# Patient Record
Sex: MALE | Race: WHITE | NOT HISPANIC OR LATINO | Employment: FULL TIME | URBAN - METROPOLITAN AREA
[De-identification: names, ages, dates, MRNs, and addresses within clinical notes are randomized per-mention and may not be internally consistent; named-entity substitution may affect disease eponyms.]

---

## 2019-01-30 ENCOUNTER — APPOINTMENT (OUTPATIENT)
Dept: RADIOLOGY | Facility: MEDICAL CENTER | Age: 29
DRG: 083 | End: 2019-01-30
Attending: EMERGENCY MEDICINE

## 2019-01-30 ENCOUNTER — HOSPITAL ENCOUNTER (OUTPATIENT)
Dept: RADIOLOGY | Facility: MEDICAL CENTER | Age: 29
End: 2019-01-30

## 2019-01-30 ENCOUNTER — HOSPITAL ENCOUNTER (INPATIENT)
Facility: MEDICAL CENTER | Age: 29
LOS: 9 days | DRG: 083 | End: 2019-02-08
Attending: EMERGENCY MEDICINE | Admitting: SURGERY

## 2019-01-30 DIAGNOSIS — I62.9 ICB (INTRACRANIAL BLEED) (HCC): ICD-10-CM

## 2019-01-30 DIAGNOSIS — S02.0XXA CLOSED FRACTURE OF VAULT OF SKULL, INITIAL ENCOUNTER (HCC): ICD-10-CM

## 2019-01-30 PROBLEM — Z53.09 CONTRAINDICATION TO DEEP VEIN THROMBOSIS (DVT) PROPHYLAXIS: Status: ACTIVE | Noted: 2019-01-30

## 2019-01-30 PROBLEM — T14.90XA TRAUMA: Status: ACTIVE | Noted: 2019-01-30

## 2019-01-30 PROBLEM — S02.91XA SKULL FRACTURE (HCC): Status: ACTIVE | Noted: 2019-01-30

## 2019-01-30 LAB
ABO GROUP BLD: NORMAL
ABO GROUP BLD: NORMAL
ALBUMIN SERPL BCP-MCNC: 4.1 G/DL (ref 3.2–4.9)
ALBUMIN/GLOB SERPL: 1.6 G/DL
ALP SERPL-CCNC: 47 U/L (ref 30–99)
ALT SERPL-CCNC: 39 U/L (ref 2–50)
ANION GAP SERPL CALC-SCNC: 10 MMOL/L (ref 0–11.9)
APTT PPP: 26.1 SEC (ref 24.7–36)
AST SERPL-CCNC: 45 U/L (ref 12–45)
BILIRUB SERPL-MCNC: 0.8 MG/DL (ref 0.1–1.5)
BLD GP AB SCN SERPL QL: NORMAL
BUN SERPL-MCNC: 20 MG/DL (ref 8–22)
CALCIUM SERPL-MCNC: 9 MG/DL (ref 8.5–10.5)
CFT BLD TEG: 5 MIN (ref 5–10)
CHLORIDE SERPL-SCNC: 107 MMOL/L (ref 96–112)
CLOT ANGLE BLD TEG: 66.1 DEGREES (ref 53–72)
CLOT LYSIS 30M P MA LENFR BLD TEG: 1 % (ref 0–8)
CO2 SERPL-SCNC: 22 MMOL/L (ref 20–33)
CREAT SERPL-MCNC: 0.89 MG/DL (ref 0.5–1.4)
CT.EXTRINSIC BLD ROTEM: 1.7 MIN (ref 1–3)
ERYTHROCYTE [DISTWIDTH] IN BLOOD BY AUTOMATED COUNT: 40.1 FL (ref 35.9–50)
ETHANOL BLD-MCNC: 0 G/DL
GLOBULIN SER CALC-MCNC: 2.5 G/DL (ref 1.9–3.5)
GLUCOSE SERPL-MCNC: 97 MG/DL (ref 65–99)
HCT VFR BLD AUTO: 44 % (ref 42–52)
HGB BLD-MCNC: 14.5 G/DL (ref 14–18)
INR PPP: 1.2 (ref 0.87–1.13)
MCF BLD TEG: 69.1 MM (ref 50–70)
MCH RBC QN AUTO: 28.4 PG (ref 27–33)
MCHC RBC AUTO-ENTMCNC: 33 G/DL (ref 33.7–35.3)
MCV RBC AUTO: 86.1 FL (ref 81.4–97.8)
PA AA BLD-ACNC: 0 %
PA ADP BLD-ACNC: 76.9 %
PLATELET # BLD AUTO: 255 K/UL (ref 164–446)
PMV BLD AUTO: 10.2 FL (ref 9–12.9)
POTASSIUM SERPL-SCNC: 3.8 MMOL/L (ref 3.6–5.5)
PROT SERPL-MCNC: 6.6 G/DL (ref 6–8.2)
PROTHROMBIN TIME: 15.3 SEC (ref 12–14.6)
RBC # BLD AUTO: 5.11 M/UL (ref 4.7–6.1)
RH BLD: NORMAL
RH BLD: NORMAL
SODIUM SERPL-SCNC: 139 MMOL/L (ref 135–145)
TEG ALGORITHM TGALG: NORMAL
WBC # BLD AUTO: 12.3 K/UL (ref 4.8–10.8)

## 2019-01-30 PROCEDURE — 85027 COMPLETE CBC AUTOMATED: CPT

## 2019-01-30 PROCEDURE — 99233 SBSQ HOSP IP/OBS HIGH 50: CPT | Performed by: SURGERY

## 2019-01-30 PROCEDURE — 304561 HCHG STAT O2

## 2019-01-30 PROCEDURE — 85347 COAGULATION TIME ACTIVATED: CPT

## 2019-01-30 PROCEDURE — 85384 FIBRINOGEN ACTIVITY: CPT

## 2019-01-30 PROCEDURE — 85576 BLOOD PLATELET AGGREGATION: CPT | Mod: 91

## 2019-01-30 PROCEDURE — 70450 CT HEAD/BRAIN W/O DYE: CPT

## 2019-01-30 PROCEDURE — 770022 HCHG ROOM/CARE - ICU (200)

## 2019-01-30 PROCEDURE — 86901 BLOOD TYPING SEROLOGIC RH(D): CPT

## 2019-01-30 PROCEDURE — 85610 PROTHROMBIN TIME: CPT

## 2019-01-30 PROCEDURE — 94760 N-INVAS EAR/PLS OXIMETRY 1: CPT

## 2019-01-30 PROCEDURE — 71045 X-RAY EXAM CHEST 1 VIEW: CPT

## 2019-01-30 PROCEDURE — 96375 TX/PRO/DX INJ NEW DRUG ADDON: CPT

## 2019-01-30 PROCEDURE — 99291 CRITICAL CARE FIRST HOUR: CPT

## 2019-01-30 PROCEDURE — 80053 COMPREHEN METABOLIC PANEL: CPT

## 2019-01-30 PROCEDURE — 86850 RBC ANTIBODY SCREEN: CPT

## 2019-01-30 PROCEDURE — 700111 HCHG RX REV CODE 636 W/ 250 OVERRIDE (IP)

## 2019-01-30 PROCEDURE — 86900 BLOOD TYPING SEROLOGIC ABO: CPT

## 2019-01-30 PROCEDURE — 85730 THROMBOPLASTIN TIME PARTIAL: CPT

## 2019-01-30 PROCEDURE — 700105 HCHG RX REV CODE 258: Performed by: SURGERY

## 2019-01-30 PROCEDURE — 700111 HCHG RX REV CODE 636 W/ 250 OVERRIDE (IP): Performed by: EMERGENCY MEDICINE

## 2019-01-30 PROCEDURE — 700111 HCHG RX REV CODE 636 W/ 250 OVERRIDE (IP): Performed by: SURGERY

## 2019-01-30 PROCEDURE — 96374 THER/PROPH/DIAG INJ IV PUSH: CPT

## 2019-01-30 PROCEDURE — 80307 DRUG TEST PRSMV CHEM ANLYZR: CPT

## 2019-01-30 PROCEDURE — 92523 SPEECH SOUND LANG COMPREHEN: CPT

## 2019-01-30 PROCEDURE — G0390 TRAUMA RESPONS W/HOSP CRITI: HCPCS

## 2019-01-30 RX ORDER — DOCUSATE SODIUM 100 MG/1
100 CAPSULE, LIQUID FILLED ORAL 2 TIMES DAILY
Status: DISCONTINUED | OUTPATIENT
Start: 2019-01-30 | End: 2019-02-08 | Stop reason: HOSPADM

## 2019-01-30 RX ORDER — AMOXICILLIN 250 MG
1 CAPSULE ORAL
Status: DISCONTINUED | OUTPATIENT
Start: 2019-01-30 | End: 2019-02-08 | Stop reason: HOSPADM

## 2019-01-30 RX ORDER — MORPHINE SULFATE 10 MG/ML
INJECTION, SOLUTION INTRAMUSCULAR; INTRAVENOUS
Status: COMPLETED
Start: 2019-01-30 | End: 2019-01-30

## 2019-01-30 RX ORDER — AMOXICILLIN 250 MG
1 CAPSULE ORAL NIGHTLY
Status: DISCONTINUED | OUTPATIENT
Start: 2019-01-30 | End: 2019-02-01

## 2019-01-30 RX ORDER — FAMOTIDINE 20 MG/1
20 TABLET, FILM COATED ORAL 2 TIMES DAILY
Status: DISCONTINUED | OUTPATIENT
Start: 2019-01-30 | End: 2019-01-31

## 2019-01-30 RX ORDER — POLYETHYLENE GLYCOL 3350 17 G/17G
1 POWDER, FOR SOLUTION ORAL 2 TIMES DAILY
Status: DISCONTINUED | OUTPATIENT
Start: 2019-01-30 | End: 2019-02-08 | Stop reason: HOSPADM

## 2019-01-30 RX ORDER — ENEMA 19; 7 G/133ML; G/133ML
1 ENEMA RECTAL
Status: DISCONTINUED | OUTPATIENT
Start: 2019-01-30 | End: 2019-02-08 | Stop reason: HOSPADM

## 2019-01-30 RX ORDER — BISACODYL 10 MG
10 SUPPOSITORY, RECTAL RECTAL
Status: DISCONTINUED | OUTPATIENT
Start: 2019-01-30 | End: 2019-02-08 | Stop reason: HOSPADM

## 2019-01-30 RX ORDER — ONDANSETRON 2 MG/ML
4 INJECTION INTRAMUSCULAR; INTRAVENOUS EVERY 4 HOURS PRN
Status: DISCONTINUED | OUTPATIENT
Start: 2019-01-30 | End: 2019-02-08 | Stop reason: HOSPADM

## 2019-01-30 RX ORDER — HYDROCODONE BITARTRATE AND ACETAMINOPHEN 5; 325 MG/1; MG/1
1-2 TABLET ORAL EVERY 6 HOURS PRN
Status: DISCONTINUED | OUTPATIENT
Start: 2019-01-30 | End: 2019-01-31

## 2019-01-30 RX ORDER — ONDANSETRON 2 MG/ML
INJECTION INTRAMUSCULAR; INTRAVENOUS
Status: COMPLETED | OUTPATIENT
Start: 2019-01-30 | End: 2019-01-30

## 2019-01-30 RX ORDER — SODIUM CHLORIDE 9 MG/ML
INJECTION, SOLUTION INTRAVENOUS CONTINUOUS
Status: DISCONTINUED | OUTPATIENT
Start: 2019-01-30 | End: 2019-02-02

## 2019-01-30 RX ORDER — MORPHINE SULFATE 4 MG/ML
INJECTION, SOLUTION INTRAMUSCULAR; INTRAVENOUS
Status: COMPLETED | OUTPATIENT
Start: 2019-01-30 | End: 2019-01-30

## 2019-01-30 RX ORDER — MORPHINE SULFATE 4 MG/ML
4 INJECTION, SOLUTION INTRAMUSCULAR; INTRAVENOUS
Status: DISCONTINUED | OUTPATIENT
Start: 2019-01-30 | End: 2019-01-31

## 2019-01-30 RX ADMIN — SODIUM CHLORIDE: 9 INJECTION, SOLUTION INTRAVENOUS at 08:33

## 2019-01-30 RX ADMIN — MORPHINE SULFATE 4 MG: 4 INJECTION INTRAVENOUS at 23:54

## 2019-01-30 RX ADMIN — ONDANSETRON 4 MG: 2 INJECTION INTRAMUSCULAR; INTRAVENOUS at 23:54

## 2019-01-30 RX ADMIN — ONDANSETRON 4 MG: 2 INJECTION INTRAMUSCULAR; INTRAVENOUS at 06:23

## 2019-01-30 RX ADMIN — MORPHINE SULFATE 4 MG: 4 INJECTION INTRAVENOUS at 14:14

## 2019-01-30 RX ADMIN — MORPHINE SULFATE 4 MG: 4 INJECTION INTRAVENOUS at 20:48

## 2019-01-30 RX ADMIN — MORPHINE SULFATE 4 MG: 10 INJECTION INTRAVENOUS at 08:28

## 2019-01-30 RX ADMIN — MORPHINE SULFATE 4 MG: 4 INJECTION INTRAVENOUS at 06:23

## 2019-01-30 RX ADMIN — MORPHINE SULFATE 4 MG: 4 INJECTION INTRAVENOUS at 09:47

## 2019-01-30 RX ADMIN — SODIUM CHLORIDE 500 MG: 9 INJECTION, SOLUTION INTRAVENOUS at 19:55

## 2019-01-30 RX ADMIN — MORPHINE SULFATE 4 MG: 4 INJECTION INTRAVENOUS at 18:07

## 2019-01-30 RX ADMIN — SODIUM CHLORIDE 1000 ML: 9 INJECTION, SOLUTION INTRAVENOUS at 07:44

## 2019-01-30 ASSESSMENT — LIFESTYLE VARIABLES
ALCOHOL_USE: YES
EVER HAD A DRINK FIRST THING IN THE MORNING TO STEADY YOUR NERVES TO GET RID OF A HANGOVER: NO
AVERAGE NUMBER OF DAYS PER WEEK YOU HAVE A DRINK CONTAINING ALCOHOL: 2
EVER_SMOKED: NEVER
HAVE YOU EVER FELT YOU SHOULD CUT DOWN ON YOUR DRINKING: NO
EVER FELT BAD OR GUILTY ABOUT YOUR DRINKING: NO
HOW MANY TIMES IN THE PAST YEAR HAVE YOU HAD 5 OR MORE DRINKS IN A DAY: 0
TOTAL SCORE: 0
TOTAL SCORE: 0
ON A TYPICAL DAY WHEN YOU DRINK ALCOHOL HOW MANY DRINKS DO YOU HAVE: 4
TOTAL SCORE: 0
CONSUMPTION TOTAL: NEGATIVE
EVER_SMOKED: NEVER
HAVE PEOPLE ANNOYED YOU BY CRITICIZING YOUR DRINKING: NO

## 2019-01-30 ASSESSMENT — COGNITIVE AND FUNCTIONAL STATUS - GENERAL
DAILY ACTIVITIY SCORE: 24
MOBILITY SCORE: 24
SUGGESTED CMS G CODE MODIFIER DAILY ACTIVITY: CH
SUGGESTED CMS G CODE MODIFIER MOBILITY: CH

## 2019-01-30 ASSESSMENT — ENCOUNTER SYMPTOMS
SHORTNESS OF BREATH: 0
ABDOMINAL DISTENTION: 0

## 2019-01-30 ASSESSMENT — COPD QUESTIONNAIRES
DURING THE PAST 4 WEEKS HOW MUCH DID YOU FEEL SHORT OF BREATH: NONE/LITTLE OF THE TIME
DO YOU EVER COUGH UP ANY MUCUS OR PHLEGM?: NO/ONLY WITH OCCASIONAL COLDS OR INFECTIONS
COPD SCREENING SCORE: 0
HAVE YOU SMOKED AT LEAST 100 CIGARETTES IN YOUR ENTIRE LIFE: NO/DON'T KNOW

## 2019-01-30 NOTE — ASSESSMENT & PLAN NOTE
Systemic anticoagulation contraindicated secondary to elevated bleeding risk.  1/31, 2/7 Trauma screening bilateral lower extremity venous duplex negative for above knee DVT

## 2019-01-30 NOTE — ED PROVIDER NOTES
ED Provider Note      CHIEF COMPLAINT  Head injury-trauma green    HPI  This is a 28-year-old male who presents to the emergency department after a ground-level fall.  Walking home from a bar last night.  Slipped falling hitting the back of his head on the ground.  Went to outside hospital.  Had a CT scan done of the head and cervical spine.  Cervical spine was reportedly negative.  Noted to have a right frontal intraparenchymal hemorrhage and subarachnoid hemorrhage.  He was referred here for neurosurgical capability.  He complains of headache and severe light sensitivity.  Headache is diffuse and throbbing worse over the back of his head.  Very tender to touch the area by his report.  He has some vague neck pain.  He denies other complaints.  He does report he has a history of a lumbar fracture and always has pain in this area, but no new pain.  Denies weakness numbness slurred speech or confusion.  He states that he has decreased hearing out of the left ear.    REVIEW OF SYSTEMS  As above  All other systems are negative.      PAST MEDICAL HISTORY  Lumbar fracture    FAMILY HISTORY  No family history on file.    SOCIAL HISTORY  Denies illicit drugs    SURGICAL HISTORY  No past surgical history on file.    CURRENT MEDICATIONS  No medications    ALLERGIES  Allergies not on file    PHYSICAL EXAM  VITAL SIGNS: See chart  Constitutional: He is awake and alert covering his eyes complaining of photophobia  HENT: Head with tender occiput, face without suggestion of fracture, TMs without hemotympanum, oropharynx without trauma  Eyes: PERRL, Conjunctiva normal, No discharge.   Neck: Diffuse mild tenderness  Cardiovascular: Normal heart rate, Normal rhythm.   Thorax & Lungs: Normal breath sounds, No respiratory distress, No wheezing, No chest tenderness.  Does report he lifted weights yesterday and his pectoral muscles are sore  Abdomen: Bowel sounds normal, Soft, No tenderness, No masses, No pulsatile masses. No tenderness  over solid organ.  Skin: No suturable lacerations.   Back: No focal midline thoracic or lumbar tenderness  Musculoskeletal: No focal bony tenderness  Neurologic: Alert & oriented x 3, Normal motor function, Normal sensory function, No focal deficits noted.     Labs:  Results for orders placed or performed during the hospital encounter of 01/30/19   DIAGNOSTIC ALCOHOL   Result Value Ref Range    Diagnostic Alcohol 0.00 0.00 g/dL   CBC WITHOUT DIFFERENTIAL   Result Value Ref Range    WBC 12.3 (H) 4.8 - 10.8 K/uL    RBC 5.11 4.70 - 6.10 M/uL    Hemoglobin 14.5 14.0 - 18.0 g/dL    Hematocrit 44.0 42.0 - 52.0 %    MCV 86.1 81.4 - 97.8 fL    MCH 28.4 27.0 - 33.0 pg    MCHC 33.0 (L) 33.7 - 35.3 g/dL    RDW 40.1 35.9 - 50.0 fL    Platelet Count 255 164 - 446 K/uL    MPV 10.2 9.0 - 12.9 fL   COMP METABOLIC PANEL   Result Value Ref Range    Sodium 139 135 - 145 mmol/L    Potassium 3.8 3.6 - 5.5 mmol/L    Chloride 107 96 - 112 mmol/L    Co2 22 20 - 33 mmol/L    Anion Gap 10.0 0.0 - 11.9    Glucose 97 65 - 99 mg/dL    Bun 20 8 - 22 mg/dL    Creatinine 0.89 0.50 - 1.40 mg/dL    Calcium 9.0 8.5 - 10.5 mg/dL    AST(SGOT) 45 12 - 45 U/L    ALT(SGPT) 39 2 - 50 U/L    Alkaline Phosphatase 47 30 - 99 U/L    Total Bilirubin 0.8 0.1 - 1.5 mg/dL    Albumin 4.1 3.2 - 4.9 g/dL    Total Protein 6.6 6.0 - 8.2 g/dL    Globulin 2.5 1.9 - 3.5 g/dL    A-G Ratio 1.6 g/dL   Prothrombin Time   Result Value Ref Range    PT 15.3 (H) 12.0 - 14.6 sec    INR 1.20 (H) 0.87 - 1.13   APTT   Result Value Ref Range    APTT 26.1 24.7 - 36.0 sec   ESTIMATED GFR   Result Value Ref Range    GFR If African American >60 >60 mL/min/1.73 m 2    GFR If Non African American >60 >60 mL/min/1.73 m 2       RADIOLOGY/PROCEDURES  CT-HEAD W/O   Final Result         No significant interval change.      Multiple hemorrhagic contusions in the right frontal lobe are grossly unchanged.      There is adjacent anterior parafalcine and right inferior frontal subdural hemorrhage.       Nondisplaced fracture of the left occipital calvarium            DX-CHEST-LIMITED (1 VIEW)   Final Result         No acute cardiopulmonary abnormalities are identified.      OUTSIDE IMAGES-DX CHEST   Final Result      OUTSIDE IMAGES-CT CERVICAL SPINE   Final Result      OUTSIDE IMAGES-CT HEAD   Final Result         Imaging is interpreted by radiologist    COURSE & MEDICAL DECISION MAKING  Patient presents after head trauma.  Identified to have an intracranial injury.  He complained of worsening and severe headache.  He was given morphine.  I obtained repeat CT scan without change.  I consulted Dr. Lara with neurosurgery.  Patient will be admitted to the trauma ICU.  I consulted Dr. Salvador..         FINAL IMPRESSION  1.  Traumatic intracranial hemorrhage          This dictation was created using voice recognition software. The accuracy of the dictation is limited to the abilities of the software.  The nursing notes were reviewed and certain aspects of this information were incorporated into this note.      Electronically signed by: Francis Cox, 1/30/2019

## 2019-01-30 NOTE — PROGRESS NOTES
Med Rec Updated and Complete per Pt at bedside  Allergies Reviewed and Updated  No PO ABX last 30 days    No significant changes noted.    Pt denies taking RX or OTC medication at this time.

## 2019-01-30 NOTE — ED TRIAGE NOTES
"Chief Complaint   Patient presents with   • Trauma Green     GLF, head injury     Blood pressure 136/60, pulse (!) 56, temperature 37.7 °C (99.9 °F), temperature source Temporal, resp. rate 16, height 1.778 m (5' 10\"), weight 72.6 kg (160 lb), SpO2 95 %.    [facility] transfer from [place] after the pt slipped on the ice, hit his head, diagnosed with skull fracture and brain bleed. Pt c/o photo sensitivity, head, neck pain. AxOx4, GCS 15.  "

## 2019-01-30 NOTE — THERAPY
"Speech Language Therapy Evaluation completed to address cognition    Functional Status: Pt was seen at bedside for cognitive-linguistic evaluation. Pt was alert and oriented x4; however, with c/o significant pain (level of '8' out of '10') and severe light sensitivity, resulting in reduced eye opening throughout session. Pt agreeable to participate, despite multiple offers to complete evaluation later in the day or tomorrow.     Pt was administered the Cognitive-Linguistic Quick Test (CLQT) which is a standardized assessment of various cognitive domains. Pt's results are compared against same-aged peers and then a composite severity rating score is assigned. Pt scored WFL for all domains tested today (\"189\" for Attention, \"177\" for Memory, \"26\" for Executive Functions, \"34\" for Language and \"85\" for Visuospatial skills.) Of note, Pt's scores for Executive Functions and Visuospatial Skills were within 2-3 points of the cut-off for WFL vs. Mild; however, expect this to be likely due to light sensitivity, headache and pain level, as the majority of these assessments require the Pt to look at and/or write on paper. Pt received an overall composite severity rating of 4.0 which is considered WFL.    Pt was also administered informal evaluation of writing, and demonstrated overall decent legibility for name and address; however, had slightly worse handwriting/legibility for copying a sentence due to Pt's effort to write the sentence with his eyes closed. Pt reported that he could not longer keep his eyes open for any further testing of reading, following written directions, etc. Therefore, session was discontinued. Given the aforementioned information, Pt does not appear to have decline in cognitive functioning; however, suspect that the Pt will have significant safety difficulties working, driving, etc until his pain levels improve and his sensitivity to light decreases. No further inpatient skilled SLP services are " "warranted. SLP to sign off. RN updated and aware. Pt verbalized understanding. Thank you for the consult.     Recommendations:    1.) Continue with efforts to decrease pain levels and decrease light sensitivity in order to safely return to work, driving, etc.    Plan of Care: Patient with no further skilled SLP needs in the acute care setting at this time  Post-Acute Therapy: Anticipate that the patient will have no further speech therapy needs after discharge from the hospital.    See \"Rehab Therapy-Acute\" Patient Summary Report for complete documentation.   "

## 2019-01-30 NOTE — PROGRESS NOTES
Trauma / Surgical Daily Progress Note    Date of Service  1/30/2019    Chief Complaint  28 y.o. male admitted 1/30/2019 with SAH (subarachnoid hemorrhage) (HCC)    Interval Events  Cerebral contusions  GCS 15  OOB no , refused  Headache  norco for pain  ICU     Review of Systems  Review of Systems   Respiratory: Negative for shortness of breath.    Cardiovascular: Negative for chest pain.   Gastrointestinal: Negative for abdominal distention.        Vital Signs for last 24 hours  Temp:  [37.3 °C (99.1 °F)-37.7 °C (99.9 °F)] 37.3 °C (99.1 °F)  Pulse:  [46-81] 53  Resp:  [8-39] 15  BP: (136-139)/(60-72) 136/60  SpO2:  [94 %-99 %] 99 %    Hemodynamic parameters for last 24 hours       Respiratory Data     Respiration: 15, Pulse Oximetry: 99 %             Physical Exam  Physical Exam   HENT:   Head: Normocephalic.   Eyes: Pupils are equal, round, and reactive to light.   Cardiovascular: Regular rhythm.    Pulmonary/Chest: No respiratory distress.   Abdominal: Soft. He exhibits no distension. There is no tenderness.   Neurological: GCS eye subscore is 4. GCS verbal subscore is 5. GCS motor subscore is 6.   Skin: He is not diaphoretic.       Laboratory  Recent Results (from the past 24 hour(s))   DIAGNOSTIC ALCOHOL    Collection Time: 01/30/19  6:07 AM   Result Value Ref Range    Diagnostic Alcohol 0.00 0.00 g/dL   CBC WITHOUT DIFFERENTIAL    Collection Time: 01/30/19  6:07 AM   Result Value Ref Range    WBC 12.3 (H) 4.8 - 10.8 K/uL    RBC 5.11 4.70 - 6.10 M/uL    Hemoglobin 14.5 14.0 - 18.0 g/dL    Hematocrit 44.0 42.0 - 52.0 %    MCV 86.1 81.4 - 97.8 fL    MCH 28.4 27.0 - 33.0 pg    MCHC 33.0 (L) 33.7 - 35.3 g/dL    RDW 40.1 35.9 - 50.0 fL    Platelet Count 255 164 - 446 K/uL    MPV 10.2 9.0 - 12.9 fL   COMP METABOLIC PANEL    Collection Time: 01/30/19  6:07 AM   Result Value Ref Range    Sodium 139 135 - 145 mmol/L    Potassium 3.8 3.6 - 5.5 mmol/L    Chloride 107 96 - 112 mmol/L    Co2 22 20 - 33 mmol/L    Anion Gap  10.0 0.0 - 11.9    Glucose 97 65 - 99 mg/dL    Bun 20 8 - 22 mg/dL    Creatinine 0.89 0.50 - 1.40 mg/dL    Calcium 9.0 8.5 - 10.5 mg/dL    AST(SGOT) 45 12 - 45 U/L    ALT(SGPT) 39 2 - 50 U/L    Alkaline Phosphatase 47 30 - 99 U/L    Total Bilirubin 0.8 0.1 - 1.5 mg/dL    Albumin 4.1 3.2 - 4.9 g/dL    Total Protein 6.6 6.0 - 8.2 g/dL    Globulin 2.5 1.9 - 3.5 g/dL    A-G Ratio 1.6 g/dL   Prothrombin Time    Collection Time: 01/30/19  6:07 AM   Result Value Ref Range    PT 15.3 (H) 12.0 - 14.6 sec    INR 1.20 (H) 0.87 - 1.13   APTT    Collection Time: 01/30/19  6:07 AM   Result Value Ref Range    APTT 26.1 24.7 - 36.0 sec   PLATELET MAPPING WITH BASIC TEG    Collection Time: 01/30/19  6:07 AM   Result Value Ref Range    Reaction Time Initial-R 5.0 5.0 - 10.0 min    Clot Kinetics-K 1.7 1.0 - 3.0 min    Clot Angle-Angle 66.1 53.0 - 72.0 degrees    Maximum Clot Strength-MA 69.1 50.0 - 70.0 mm    Lysis 30 minutes-LY30 1.0 0.0 - 8.0 %    % Inhibition ADP 76.9 %    % Inhibition AA 0.0 %    TEG Algorithm Link Algorithm    COD - Adult (Type and Screen)    Collection Time: 01/30/19  6:07 AM   Result Value Ref Range    ABO Grouping Only A     Rh Grouping Only POS     Antibody Screen-Cod NEG    ESTIMATED GFR    Collection Time: 01/30/19  6:07 AM   Result Value Ref Range    GFR If African American >60 >60 mL/min/1.73 m 2    GFR If Non African American >60 >60 mL/min/1.73 m 2   ABO AND RH CONFIRMATION    Collection Time: 01/30/19  6:52 AM   Result Value Ref Range    ABO Confirm A     Second Rh Group POS        Fluids    Intake/Output Summary (Last 24 hours) at 01/30/19 0928  Last data filed at 01/30/19 0900   Gross per 24 hour   Intake           126.67 ml   Output                0 ml   Net           126.67 ml       Core Measures & Quality Metrics  Labs reviewed, Medications reviewed and Radiology images reviewed        DVT Prophylaxis: Enoxaparin (Lovenox)            ORLIN Score  ETOH Screening    Assessment/Plan  ICB (intracranial  bleed) (HCC)- (present on admission)   Assessment & Plan    Referring facility CT head shows right frontal intraparenchymal 2cm hemorrhage with a smaller right frontal convexity 11mm.   Frontal falx subarachnoid hemorrhage extending to the left 5mm with a right to left shift  Small frontal floor extra-axial blood noted.   TEG with platelet mapping pending  Repeat CT pending  Definitive plan pending.  Russell Lara MD. Neurosurgery.     Skull fracture (HCC)- (present on admission)   Assessment & Plan    Referring facility CT head shows post-occipital skull fracture.  Definitive plan pending.  Russell Lara MD. Neurosurgery.     Contraindication to deep vein thrombosis (DVT) prophylaxis- (present on admission)   Assessment & Plan    Systemic anticoagulation contraindicated secondary to elevated bleeding risk.  1/30 Surveillance venous duplex scanning if unable to initiate chemical DVT prophylaxis within 48 hrs of admission.     Trauma- (present on admission)   Assessment & Plan    Fall on ice, striking back of head. (+)loss of consciousness  Trauma Green Transfer Activation.  Kristin Salvador MD, Trauma/General Surgery.         Discussed patient condition with RN, RT, Pharmacy and Dietary.  CRITICAL CARE TIME EXCLUDING PROCEDURES: 35   minutes

## 2019-01-30 NOTE — ASSESSMENT & PLAN NOTE
Fall on ice, striking back of head. (+)loss of consciousness  Trauma Green Transfer Activation.  Kristin Salvador MD, Trauma/General Surgery.

## 2019-01-30 NOTE — ED NOTES
Received report from ODETTE Lizama. Patient hooked to monitor. Patient requested to dim the light.

## 2019-01-30 NOTE — ED NOTES
1st pt contact, returned from imaging.  Resting quietly on stretcher.  C/O 9.5/10 headache and severe photophobia, denies nausea, neuro intact, MANCILLA, equal  and dorsiflexion bilat.  Ice pack and towel over eyes for comfort.  HOB elevated 30 degrees.  Trauma MD at bedside.  Awaiting admit bed assignment.  Pt aware of status.

## 2019-01-30 NOTE — CONSULTS
Neurosurgery Consult Note    Patient: Lalo Alvarado    MRN: [id number]    Date of Consultation: 1/30/2019    Reason for Consultation: Bifrontal cerebral contusions with some traumatic subarachnoid hemorrhage    Referring Physician: Dr. Francis Cox    History of Present Illness:  Lalo Alvarado is a 28-year-old male who presents to the emergency department after a ground-level fall.  Walking home from a bar last night.  Slipped on ice, falling back and hitting the back of his head on the ground.  Unknown loss of consciousness.  Went to outside hospital.  Had a CT scan done of the head and cervical spine.  Cervical spine was reportedly negative.  Noted to have a right frontal intraparenchymal hemorrhage and subarachnoid hemorrhage.  He was referred here for neurosurgical capability.  He complains of headache and severe light sensitivity.  Headache is diffuse and throbbing worse over the back of his head.  Very tender to touch the area by his report.  He has some vague neck pain.  He denies other complaints.  He does report he has a history of a lumbar fracture and always has pain in this area, but no new pain.  Denies weakness numbness slurred speech or confusion.  He states that he has decreased hearing out of the left ear.    Review of Systems:  Constitutional: Denies fevers, chills, night sweats, or weight changes  Eyes: Denies changes in vision, or eye pain  Ears/Nose/Throat/Mouth: Denies nasal congestion or sore throat; decreased hearing in left ear  Cardiovascular: Denies chest pain or palpitations   Respiratory: Denies shortness of breath, or cough  Gastrointestinal/Hepatic: Denies abdominal pain, nausea, vomiting, diarrhea, or constipation  Genitourinary: Denies dysuria, frequency, or incontinence  Musculoskeletal/Rheum: Denies joint pain or swelling   Skin: Denies rash  Neurological: Denies confusion, memory loss, focal weakness, or parasthesias  Psychiatric: Denies mood disorder   Endocrine: Denies hx of  diabetes or thyroid dysfunction  Heme/Oncology/Lymph Nodes: Denies enlarged lymph nodes, bruising, or known bleeding disorder  Allergic/Immunologic: Denies hx of autoimmune disorder      Medications:  Current Facility-Administered Medications   Medication Dose Route Frequency Provider Last Rate Last Dose   • Respiratory Care per Protocol   Nebulization Continuous RT Kristin Salvador M.D.       • Pharmacy Consult Request ...Pain Management Review 1 Each  1 Each Other PHARMACY TO DOSE Kristin Salvador M.D.       • docusate sodium (COLACE) capsule 100 mg  100 mg Oral BID Kristin Salvador M.D.       • senna-docusate (PERICOLACE or SENOKOT S) 8.6-50 MG per tablet 1 Tab  1 Tab Oral Nightly Kristin Salvador M.D.       • senna-docusate (PERICOLACE or SENOKOT S) 8.6-50 MG per tablet 1 Tab  1 Tab Oral Q24HRS PRN Kristin Salvador M.D.       • polyethylene glycol/lytes (MIRALAX) PACKET 1 Packet  1 Packet Oral BID Kristin Salvador M.D.       • magnesium hydroxide (MILK OF MAGNESIA) suspension 30 mL  30 mL Oral DAILY Kristin Salvador M.D.       • bisacodyl (DULCOLAX) suppository 10 mg  10 mg Rectal Q24HRS PRN Kristin Salvador M.D.       • fleet enema 133 mL  1 Each Rectal Once PRN Kristin Salvador M.D.       • NS infusion   Intravenous Continuous Camilo Mitchell M.D. 50 mL/hr at 01/30/19 0932     • morphine (pf) 4 mg/ml injection 4 mg  4 mg Intravenous Q HOUR PRN Kristin Salvador M.D.   4 mg at 01/30/19 0947   • famotidine (PEPCID) tablet 20 mg  20 mg Oral BID Kristin Salvador M.D.        Or   • famotidine (PEPCID) injection 20 mg  20 mg Intravenous BID Kristin Salvador M.D.       • ondansetron (ZOFRAN) syringe/vial injection 4 mg  4 mg Intravenous Q4HRS PRN Kristin Salvador M.D.       • levETIRAcetam (KEPPRA) 500 mg in  mL IVPB  500 mg Intravenous BID Kristin Salvador M.D.       • HYDROcodone-acetaminophen (NORCO) 5-325 MG per tablet 1-2 Tab  1-2 Tab Oral Q6HRS PRN Camilo Mitchell M.D.           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 No pertinent family history.    Social History:  Social History     Social History   • Marital status: Single     Spouse name: N/A   • Number of children: N/A   • Years of education: N/A     Occupational History   • Not on file.     Social History Main Topics   • Smoking status: Current Some Day Smoker   • Smokeless tobacco: Never Used   • Alcohol use Yes      Comment: occ   • Drug use: Yes      Comment: marijuana   • Sexual activity: Not on file     Other Topics Concern   • Not on file     Social History Narrative   • No narrative on file       Physical Examination:  Alert and oriented  Very photophobic; wearing an eye mask  Pupils 4 mm reactive  Extraocular eye movements are intact  Face is symmetric  There is no hemotympanum or CSF otorrhea  No pronator drift  Power 5/5 in all 4 extremities  Sensation is intact in all 4 extremities    Labs:  Recent Labs      01/30/19   0607   WBC  12.3*   RBC  5.11   HEMOGLOBIN  14.5   HEMATOCRIT  44.0   MCV  86.1   MCH  28.4   MCHC  33.0*   RDW  40.1   PLATELETCT  255   MPV  10.2     Recent Labs      01/30/19   0607   SODIUM  139   POTASSIUM  3.8   CHLORIDE  107   CO2  22   GLUCOSE  97   BUN  20   CREATININE  0.89   CALCIUM  9.0     Recent Labs      01/30/19   0607   APTT  26.1   INR  1.20*     Recent Labs      01/30/19   0607   REACTMIN  5.0   CLOTKINET  1.7   CLOTANGL  66.1   MAXCLOTS  69.1   [id number]  1.0   PRCINADP  76.9   PRCINAA  0.0       Imaging:  CT scan of the head shows bifrontal contusions, greater on the right than on the left.  There is a small amount of traumatic subarachnoid blood along the anterior aspect of the falx.  I see no  basal skull fracture, and there is no opacification of the mastoid air cells.    Assessment and Plan:  Lalo Alvarado is a 28-year-old male who fell and hit the back of his head and has bifrontal contusions.  Is very photophobic, and likely has a severe concussion.  He is complaining of decreased hearing on the left, but I see no evidence of skull fracture and there is no hemotympanum/CSF otorrhea.  The bifrontal contusions are stable on follow-up scanning.  He can be mobilized as tolerated and diet advanced.  Neuro vitals every 4 hours.  I think he is fine to go to the floor.  There is no plan for any neurosurgical intervention at this point in time.      Russell Lara M.D.

## 2019-01-30 NOTE — CARE PLAN
Problem: Safety  Goal: Will remain free from injury    Intervention: Provide assistance with mobility  Educated about fall risk, verbalizes understanding. Bed alarm on. Call light in reach.       Problem: Knowledge Deficit  Goal: Knowledge of disease process/condition, treatment plan, diagnostic tests, and medications will improve    Intervention: Assess knowledge level of disease process/condition, treatment plan, diagnostic tests, and medications  Pt updated on POC. Demonstrated understanding by teach back. All questions answered.

## 2019-01-30 NOTE — PROGRESS NOTES
0810 - Bedside report in the ER with Sana PINO.  0876 - Arrived to [facility]. Admit profile and assessment complete.

## 2019-01-30 NOTE — ASSESSMENT & PLAN NOTE
Referring facility CT head shows right frontal intraparenchymal 2cm hemorrhage with a smaller right frontal convexity 11mm.   Frontal falx subarachnoid hemorrhage extending to the left 5mm with a right to left shift  Small frontal floor extra-axial blood noted.   Repeat CT imaging with no significant interval change  Keppra x 7 days (2/5/19)  Non-operative management.  Russell Lara MD. Neurosurgery

## 2019-01-30 NOTE — H&P
DATE OF ADMISSION:  01/30/2019    IDENTIFICATION:  A 28-year-old male.    HISTORY OF PRESENT ILLNESS:  Patient was transferred from [place] after he   slipped on ice and hit his head with questionable loss of consciousness.  He   was evaluated there where he was found to have a skull fracture as well as an   intracranial hemorrhage and subarachnoid hemorrhage.  He transferred to [facility] as a trauma green transfer.    PAST MEDICAL HISTORY:  ILLNESSES:  None.    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    MEDICATIONS:  None.    ALLERGIES:  TO AMOXICILLIN AND PENICILLIN.    SOCIAL HISTORY:  Unobtainable.    REVIEW OF SYSTEMS:  Unobtainable.    PHYSICAL EXAMINATION:  VITAL SIGNS:  His blood pressure is 130/67, heart rate 60.  GENERAL:  He is alert, but significantly photophobic.  HEENT:  His head is without obvious trauma.  Pupils are 2 mm bilaterally.    Extraocular movements intact.  NECK:  His neck is not in a C-collar, but he does have some tenderness in his   neck.  CHEST:  Nontender.  ABDOMEN:  Soft.  PELVIS:  Stable.  EXTREMITIES:  He is moving all extremities.  Strength is 5/5 throughout.  NEUROLOGIC:  GCS of 15.    IMAGING DATA:  CT of his head a right frontal intracranial hematoma with   subarachnoid hemorrhage.  He also has a skull fracture of the occiput.  CT of   the neck was performed at outside hospital demonstrated no evidence of   fracture.    LABORATORY DATA:  Demonstrate white count of 12,000, hemoglobin is 14, his   platelet count is 255,000, INR is 1.2 and his platelet mapping is pending.    IMPRESSION:  A 28-year-old male, status post ground level fall with a skull   fracture and an intraparenchymal hemorrhage.    PLAN:  Plan will be for him to be admitted to the ICU.  He will be seen by Dr. Lara in regards to his intracranial hemorrhage.  Serial CT scans will be   ordered.  He will also be placed on  Keppra and have him regular neurologic   checks, otherwise he has no other injuries and we will monitor further   secondary signs of brain injury.       ____________________________________     MD DEENA SAMUEL / ALESHA    DD:  01/30/2019 07:31:36  DT:  01/30/2019 07:58:01    D#:  [id number]  Job#:  [id number]    cc: Russell Lara MD

## 2019-01-30 NOTE — ASSESSMENT & PLAN NOTE
Referring facility CT head shows post-occipital skull fracture.  Non-operative management.  Russell Lara MD. Neurosurgery

## 2019-01-31 ENCOUNTER — APPOINTMENT (OUTPATIENT)
Dept: RADIOLOGY | Facility: MEDICAL CENTER | Age: 29
DRG: 083 | End: 2019-01-31
Attending: SURGERY

## 2019-01-31 LAB
ALBUMIN SERPL BCP-MCNC: 3.5 G/DL (ref 3.2–4.9)
ALBUMIN/GLOB SERPL: 1.6 G/DL
ALP SERPL-CCNC: 43 U/L (ref 30–99)
ALT SERPL-CCNC: 33 U/L (ref 2–50)
ANION GAP SERPL CALC-SCNC: 6 MMOL/L (ref 0–11.9)
AST SERPL-CCNC: 29 U/L (ref 12–45)
BASOPHILS # BLD AUTO: 0.3 % (ref 0–1.8)
BASOPHILS # BLD: 0.03 K/UL (ref 0–0.12)
BILIRUB SERPL-MCNC: 0.9 MG/DL (ref 0.1–1.5)
BUN SERPL-MCNC: 16 MG/DL (ref 8–22)
CALCIUM SERPL-MCNC: 9.1 MG/DL (ref 8.5–10.5)
CHLORIDE SERPL-SCNC: 103 MMOL/L (ref 96–112)
CO2 SERPL-SCNC: 24 MMOL/L (ref 20–33)
CREAT SERPL-MCNC: 0.88 MG/DL (ref 0.5–1.4)
EOSINOPHIL # BLD AUTO: 0.11 K/UL (ref 0–0.51)
EOSINOPHIL NFR BLD: 1.2 % (ref 0–6.9)
ERYTHROCYTE [DISTWIDTH] IN BLOOD BY AUTOMATED COUNT: 40.6 FL (ref 35.9–50)
GLOBULIN SER CALC-MCNC: 2.2 G/DL (ref 1.9–3.5)
GLUCOSE SERPL-MCNC: 100 MG/DL (ref 65–99)
HCT VFR BLD AUTO: 41 % (ref 42–52)
HGB BLD-MCNC: 13 G/DL (ref 14–18)
IMM GRANULOCYTES # BLD AUTO: 0.03 K/UL (ref 0–0.11)
IMM GRANULOCYTES NFR BLD AUTO: 0.3 % (ref 0–0.9)
LYMPHOCYTES # BLD AUTO: 1.5 K/UL (ref 1–4.8)
LYMPHOCYTES NFR BLD: 16.1 % (ref 22–41)
MAGNESIUM SERPL-MCNC: 1.6 MG/DL (ref 1.5–2.5)
MCH RBC QN AUTO: 28.3 PG (ref 27–33)
MCHC RBC AUTO-ENTMCNC: 31.7 G/DL (ref 33.7–35.3)
MCV RBC AUTO: 89.3 FL (ref 81.4–97.8)
MONOCYTES # BLD AUTO: 1.2 K/UL (ref 0–0.85)
MONOCYTES NFR BLD AUTO: 12.9 % (ref 0–13.4)
NEUTROPHILS # BLD AUTO: 6.45 K/UL (ref 1.82–7.42)
NEUTROPHILS NFR BLD: 69.2 % (ref 44–72)
NRBC # BLD AUTO: 0 K/UL
NRBC BLD-RTO: 0 /100 WBC
PHOSPHATE SERPL-MCNC: 2.5 MG/DL (ref 2.5–4.5)
PLATELET # BLD AUTO: 187 K/UL (ref 164–446)
PMV BLD AUTO: 10.9 FL (ref 9–12.9)
POTASSIUM SERPL-SCNC: 4.1 MMOL/L (ref 3.6–5.5)
PROT SERPL-MCNC: 5.7 G/DL (ref 6–8.2)
RBC # BLD AUTO: 4.59 M/UL (ref 4.7–6.1)
SODIUM SERPL-SCNC: 133 MMOL/L (ref 135–145)
WBC # BLD AUTO: 9.3 K/UL (ref 4.8–10.8)

## 2019-01-31 PROCEDURE — 97165 OT EVAL LOW COMPLEX 30 MIN: CPT

## 2019-01-31 PROCEDURE — A9270 NON-COVERED ITEM OR SERVICE: HCPCS | Performed by: SURGERY

## 2019-01-31 PROCEDURE — 770001 HCHG ROOM/CARE - MED/SURG/GYN PRIV*

## 2019-01-31 PROCEDURE — 80053 COMPREHEN METABOLIC PANEL: CPT

## 2019-01-31 PROCEDURE — 84100 ASSAY OF PHOSPHORUS: CPT

## 2019-01-31 PROCEDURE — 700111 HCHG RX REV CODE 636 W/ 250 OVERRIDE (IP): Performed by: SURGERY

## 2019-01-31 PROCEDURE — 700102 HCHG RX REV CODE 250 W/ 637 OVERRIDE(OP): Performed by: SURGERY

## 2019-01-31 PROCEDURE — 70450 CT HEAD/BRAIN W/O DYE: CPT

## 2019-01-31 PROCEDURE — 700105 HCHG RX REV CODE 258: Performed by: SURGERY

## 2019-01-31 PROCEDURE — 83735 ASSAY OF MAGNESIUM: CPT

## 2019-01-31 PROCEDURE — 97162 PT EVAL MOD COMPLEX 30 MIN: CPT

## 2019-01-31 PROCEDURE — 700112 HCHG RX REV CODE 229: Performed by: SURGERY

## 2019-01-31 PROCEDURE — 93970 EXTREMITY STUDY: CPT

## 2019-01-31 PROCEDURE — 85025 COMPLETE CBC W/AUTO DIFF WBC: CPT

## 2019-01-31 RX ORDER — OXYCODONE HYDROCHLORIDE 5 MG/1
5 TABLET ORAL EVERY 4 HOURS PRN
Status: DISCONTINUED | OUTPATIENT
Start: 2019-01-31 | End: 2019-02-02

## 2019-01-31 RX ORDER — MAGNESIUM SULFATE HEPTAHYDRATE 40 MG/ML
2 INJECTION, SOLUTION INTRAVENOUS ONCE
Status: COMPLETED | OUTPATIENT
Start: 2019-01-31 | End: 2019-01-31

## 2019-01-31 RX ORDER — BUTALBITAL, ACETAMINOPHEN AND CAFFEINE 50; 325; 40 MG/1; MG/1; MG/1
2 TABLET ORAL EVERY 6 HOURS PRN
Status: DISCONTINUED | OUTPATIENT
Start: 2019-01-31 | End: 2019-02-07

## 2019-01-31 RX ORDER — LEVETIRACETAM 250 MG/1
500 TABLET ORAL 2 TIMES DAILY
Status: COMPLETED | OUTPATIENT
Start: 2019-01-31 | End: 2019-02-06

## 2019-01-31 RX ADMIN — SODIUM CHLORIDE: 9 INJECTION, SOLUTION INTRAVENOUS at 00:31

## 2019-01-31 RX ADMIN — MORPHINE SULFATE 4 MG: 4 INJECTION INTRAVENOUS at 08:05

## 2019-01-31 RX ADMIN — SODIUM CHLORIDE: 9 INJECTION, SOLUTION INTRAVENOUS at 21:18

## 2019-01-31 RX ADMIN — BUTALBITAL, ACETAMINOPHEN, AND CAFFEINE 2 TABLET: 50; 325; 40 TABLET ORAL at 16:27

## 2019-01-31 RX ADMIN — MAGNESIUM SULFATE IN WATER 2 G: 40 INJECTION, SOLUTION INTRAVENOUS at 10:17

## 2019-01-31 RX ADMIN — SODIUM CHLORIDE 500 MG: 9 INJECTION, SOLUTION INTRAVENOUS at 05:53

## 2019-01-31 RX ADMIN — LEVETIRACETAM 500 MG: 500 TABLET, FILM COATED ORAL at 17:10

## 2019-01-31 RX ADMIN — OXYCODONE HYDROCHLORIDE 5 MG: 5 TABLET ORAL at 15:36

## 2019-01-31 RX ADMIN — MORPHINE SULFATE 4 MG: 4 INJECTION INTRAVENOUS at 03:53

## 2019-01-31 RX ADMIN — DOCUSATE SODIUM 100 MG: 100 CAPSULE, LIQUID FILLED ORAL at 17:10

## 2019-01-31 RX ADMIN — BUTALBITAL, ACETAMINOPHEN, AND CAFFEINE 2 TABLET: 50; 325; 40 TABLET ORAL at 10:16

## 2019-01-31 RX ADMIN — MORPHINE SULFATE 4 MG: 4 INJECTION INTRAVENOUS at 05:53

## 2019-01-31 RX ADMIN — ONDANSETRON 4 MG: 2 INJECTION INTRAMUSCULAR; INTRAVENOUS at 16:28

## 2019-01-31 RX ADMIN — Medication 1 TABLET: at 21:18

## 2019-01-31 ASSESSMENT — COGNITIVE AND FUNCTIONAL STATUS - GENERAL
SUGGESTED CMS G CODE MODIFIER MOBILITY: CH
MOBILITY SCORE: 24
DAILY ACTIVITIY SCORE: 24
SUGGESTED CMS G CODE MODIFIER DAILY ACTIVITY: CH

## 2019-01-31 ASSESSMENT — PATIENT HEALTH QUESTIONNAIRE - PHQ9
6. FEELING BAD ABOUT YOURSELF - OR THAT YOU ARE A FAILURE OR HAVE LET YOURSELF OR YOUR FAMILY DOWN: SEVERAL DAYS
8. MOVING OR SPEAKING SO SLOWLY THAT OTHER PEOPLE COULD HAVE NOTICED. OR THE OPPOSITE, BEING SO FIGETY OR RESTLESS THAT YOU HAVE BEEN MOVING AROUND A LOT MORE THAN USUAL: SEVERAL DAYS
7. TROUBLE CONCENTRATING ON THINGS, SUCH AS READING THE NEWSPAPER OR WATCHING TELEVISION: NOT AT ALL
9. THOUGHTS THAT YOU WOULD BE BETTER OFF DEAD, OR OF HURTING YOURSELF: NOT AT ALL
SUM OF ALL RESPONSES TO PHQ QUESTIONS 1-9: 6
3. TROUBLE FALLING OR STAYING ASLEEP OR SLEEPING TOO MUCH: SEVERAL DAYS
4. FEELING TIRED OR HAVING LITTLE ENERGY: SEVERAL DAYS
5. POOR APPETITE OR OVEREATING: SEVERAL DAYS
SUM OF ALL RESPONSES TO PHQ9 QUESTIONS 1 AND 2: 1
2. FEELING DOWN, DEPRESSED, IRRITABLE, OR HOPELESS: SEVERAL DAYS
1. LITTLE INTEREST OR PLEASURE IN DOING THINGS: NOT AT ALL

## 2019-01-31 ASSESSMENT — ENCOUNTER SYMPTOMS
SHORTNESS OF BREATH: 0
FEVER: 0
DOUBLE VISION: 0

## 2019-01-31 ASSESSMENT — GAIT ASSESSMENTS
GAIT LEVEL OF ASSIST: SUPERVISED
DISTANCE (FEET): 150

## 2019-01-31 ASSESSMENT — ACTIVITIES OF DAILY LIVING (ADL): TOILETING: INDEPENDENT

## 2019-01-31 NOTE — PROGRESS NOTES
Neurosurgery Progress Note    Subjective:  headache    Exam:  A&O, speech fluent & appropriate  Strength 5/5 bilaterally UE & LE, sensation grossly intact  Follows commands    Pulse  Av.9  Min: 46  Max: 85  Resp  Av  Min: 9  Max: 37  Temp  Av.2 °C (98.9 °F)  Min: 36.2 °C (97.2 °F)  Max: 37.8 °C (100.1 °F)  SpO2  Av.3 %  Min: 91 %  Max: 100 %    No Data Recorded    Recent Labs      19   0607  19   0555   WBC  12.3*  9.3   RBC  5.11  4.59*   HEMOGLOBIN  14.5  13.0*   HEMATOCRIT  44.0  41.0*   MCV  86.1  89.3   MCH  28.4  28.3   MCHC  33.0*  31.7*   RDW  40.1  40.6   PLATELETCT  255  187   MPV  10.2  10.9     Recent Labs      19   0607  19   0555   SODIUM  139  133*   POTASSIUM  3.8  4.1   CHLORIDE  107  103   CO2  22  24   GLUCOSE  97  100*   BUN  20  16   CREATININE  0.89  0.88   CALCIUM  9.0  9.1     Recent Labs      19   0607   APTT  26.1   INR  1.20*     Recent Labs      19   0607   REACTMIN  5.0   CLOTKINET  1.7   CLOTANGL  66.1   MAXCLOTS  69.1   ESQ35VVD  1.0   PRCINADP  76.9   PRCINAA  0.0       Intake/Output       19 07 - 19 0659 19 - 19 0659       Total [id number] Total       Intake    P.O.  760  900 1660  --  -- --    P.O.  -- -- --    I.V.  603.3  600 1203.3  200  -- 200    Volume (mL) (NS infusion) 603.3 600 1203.3 200 -- 200    IV Piggyback  --  146.7 146.7  53.3  -- 53.3    Volume (mL) (levETIRAcetam (KEPPRA) 500 mg in  mL IVPB) -- 146.7 146.7 53.3 -- 53.3    Total Intake 1363.3 1646.7 3010 253.3 -- 253.3       Output    Urine  400  375 775  0  -- 0    Urine Void (mL) 400 375 775 0 -- 0    Total Output 400 375 775 0 -- 0       Net I/O     963.3 1271.7 2235 253.3 -- 253.3            Intake/Output Summary (Last 24 hours) at 19 1225  Last data filed at 19 1000   Gross per 24 hour   Intake             2740 ml   Output              775 ml   Net             1965 ml             • acetaminophen/caffeine/butalbital 325-40-50 mg  2 Tab Q6HRS PRN   • oxyCODONE immediate-release  5 mg Q4HRS PRN   • levETIRAcetam  500 mg BID   • Respiratory Care per Protocol   Continuous RT   • Pharmacy Consult Request  1 Each PHARMACY TO DOSE   • docusate sodium  100 mg BID   • senna-docusate  1 Tab Nightly   • senna-docusate  1 Tab Q24HRS PRN   • polyethylene glycol/lytes  1 Packet BID   • magnesium hydroxide  30 mL DAILY   • bisacodyl  10 mg Q24HRS PRN   • fleet  1 Each Once PRN   • NS   Continuous   • ondansetron  4 mg Q4HRS PRN       Assessment and Plan:  Hospital day #2  POD #NA  Prophylactic anticoagulation: no         Start date/time: tbd  Stable bifrontal contusions on repeat scan  Neuro stable, OK to floor w/ q4 neuro checks, mobilize as tolerated  No neurosurgical intervention at this time

## 2019-01-31 NOTE — CARE PLAN
Problem: Safety  Goal: Will remain free from falls  Outcome: PROGRESSING AS EXPECTED  Bed in low position with bed alarm on. Call light within reach. Lower bed rails in place. Treaded socks in use. Pt near nurses station.       Problem: Pain Management  Goal: Pain level will decrease to patient's comfort goal  Outcome: PROGRESSING AS EXPECTED  Assessed patient's pain via 0-10 scale, medicated per MAR. Also providing ice packs, extra pillows, emotional support and therapeutic presence.

## 2019-01-31 NOTE — PROGRESS NOTES
Trauma / Surgical Daily Progress Note    Date of Service  1/31/2019    Chief Complaint  28 y.o. male admitted 1/30/2019 with SAH (subarachnoid hemorrhage) (HCC)    Interval Events  gcs 15  Headache  Fioricet  Transfer to [facility]  Supplement K      Review of Systems  Review of Systems     Vital Signs for last 24 hours  Temp:  [36.2 °C (97.2 °F)-37.8 °C (100.1 °F)] 36.7 °C (98 °F)  Pulse:  [46-77] 50  Resp:  [9-31] 13  SpO2:  [91 %-99 %] 97 %    Hemodynamic parameters for last 24 hours       Respiratory Data     Respiration: 13, Pulse Oximetry: 97 %             Physical Exam  Physical Exam   HENT:   Head: Normocephalic.   Eyes: Pupils are equal, round, and reactive to light.   Cardiovascular: Regular rhythm.    Pulmonary/Chest: No respiratory distress.   Abdominal: Soft. There is no tenderness.   Neurological: He is alert. GCS eye subscore is 4. GCS verbal subscore is 5. GCS motor subscore is 6.   Skin: Skin is warm and dry. He is not diaphoretic.       Laboratory  Recent Results (from the past 24 hour(s))   CBC with Differential: Tomorrow AM    Collection Time: 01/31/19  5:55 AM   Result Value Ref Range    WBC 9.3 4.8 - 10.8 K/uL    RBC 4.59 (L) 4.70 - 6.10 M/uL    Hemoglobin 13.0 (L) 14.0 - 18.0 g/dL    Hematocrit 41.0 (L) 42.0 - 52.0 %    MCV 89.3 81.4 - 97.8 fL    MCH 28.3 27.0 - 33.0 pg    MCHC 31.7 (L) 33.7 - 35.3 g/dL    RDW 40.6 35.9 - 50.0 fL    Platelet Count 187 164 - 446 K/uL    MPV 10.9 9.0 - 12.9 fL    Neutrophils-Polys 69.20 44.00 - 72.00 %    Lymphocytes 16.10 (L) 22.00 - 41.00 %    Monocytes 12.90 0.00 - 13.40 %    Eosinophils 1.20 0.00 - 6.90 %    Basophils 0.30 0.00 - 1.80 %    Immature Granulocytes 0.30 0.00 - 0.90 %    Nucleated RBC 0.00 /100 WBC    Neutrophils (Absolute) 6.45 1.82 - 7.42 K/uL    Lymphs (Absolute) 1.50 1.00 - 4.80 K/uL    Monos (Absolute) 1.20 (H) 0.00 - 0.85 K/uL    Eos (Absolute) 0.11 0.00 - 0.51 K/uL    Baso (Absolute) 0.03 0.00 - 0.12 K/uL    Immature Granulocytes (abs) 0.03 0.00  - 0.11 K/uL    NRBC (Absolute) 0.00 K/uL   Comp Metabolic Panel (CMP): Tomorrow AM    Collection Time: 01/31/19  5:55 AM   Result Value Ref Range    Sodium 133 (L) 135 - 145 mmol/L    Potassium 4.1 3.6 - 5.5 mmol/L    Chloride 103 96 - 112 mmol/L    Co2 24 20 - 33 mmol/L    Anion Gap 6.0 0.0 - 11.9    Glucose 100 (H) 65 - 99 mg/dL    Bun 16 8 - 22 mg/dL    Creatinine 0.88 0.50 - 1.40 mg/dL    Calcium 9.1 8.5 - 10.5 mg/dL    AST(SGOT) 29 12 - 45 U/L    ALT(SGPT) 33 2 - 50 U/L    Alkaline Phosphatase 43 30 - 99 U/L    Total Bilirubin 0.9 0.1 - 1.5 mg/dL    Albumin 3.5 3.2 - 4.9 g/dL    Total Protein 5.7 (L) 6.0 - 8.2 g/dL    Globulin 2.2 1.9 - 3.5 g/dL    A-G Ratio 1.6 g/dL   MAGNESIUM    Collection Time: 01/31/19  5:55 AM   Result Value Ref Range    Magnesium 1.6 1.5 - 2.5 mg/dL   PHOSPHORUS    Collection Time: 01/31/19  5:55 AM   Result Value Ref Range    Phosphorus 2.5 2.5 - 4.5 mg/dL   ESTIMATED GFR    Collection Time: 01/31/19  5:55 AM   Result Value Ref Range    GFR If African American >60 >60 mL/min/1.73 m 2    GFR If Non African American >60 >60 mL/min/1.73 m 2       Fluids    Intake/Output Summary (Last 24 hours) at 01/31/19 1834  Last data filed at 01/31/19 1600   Gross per 24 hour   Intake             2310 ml   Output              875 ml   Net             1435 ml       Core Measures & Quality Metrics  Labs reviewed, Medications reviewed and Radiology images reviewed        DVT Prophylaxis: Enoxaparin (Lovenox)            ORLIN Score  ETOH Screening    Assessment/Plan  ICB (intracranial bleed) (HCC)- (present on admission)   Assessment & Plan    Referring facility CT head shows right frontal intraparenchymal 2cm hemorrhage with a smaller right frontal convexity 11mm.   Frontal falx subarachnoid hemorrhage extending to the left 5mm with a right to left shift  Small frontal floor extra-axial blood noted.   Repeat CT imaging withn o significant interval change  Keppra x 7 days (2/5/19)  Non-operative  management.  Russell Lara MD. Neurosurgery.     Skull fracture (HCC)- (present on admission)   Assessment & Plan    Referring facility CT head shows post-occipital skull fracture.  Non-operative management.  Russell Lara MD. Neurosurgery     Contraindication to deep vein thrombosis (DVT) prophylaxis- (present on admission)   Assessment & Plan    Systemic anticoagulation contraindicated secondary to elevated bleeding risk.  1/30 Surveillance venous duplex scanning if unable to initiate chemical DVT prophylaxis within 48 hrs of admission     Trauma- (present on admission)   Assessment & Plan    Fall on ice, striking back of head. (+)loss of consciousness  Trauma Green Transfer Activation.  Kristin Salvador MD, Trauma/General Surgery.         Discussed patient condition with RN, RT, Pharmacy and Dietary.  CRITICAL CARE TIME EXCLUDING PROCEDURES:  35   Minutes  Transfer to [facility]

## 2019-01-31 NOTE — DISCHARGE PLANNING
"Care Transition Team Assessment    In the case of an emergency, pt's legal NOK is Anika Roger (mother) [phone]    LSW met with pt at bedside and obtained the information used in this assessment. Pt verified accuracy of facesheet. Pt lives in a 3 story house with his landlord.  Pt does not have a pharmacy. Prior to current hospitalization, pt was completely independent in ADLS/IADLS. Pt drives and is able to attend necessary MD appointments. Pt stated that he \"almost works full time\" and does not have financial concerns. Pt has a good support system. Pt denies any hx of substance use and denies any dx of mh. Pt did not open his eyes during assessment and was not engaged in answering questions. Pt stated that he does have [employer], LSW verified SSN and sent e-mail to [employer] to look up benefits.         Information Source  Orientation : Oriented x 4  Information Given By: Patient  Informant's Name:  (Lalo)  Who is responsible for making decisions for patient? : Patient    Readmission Evaluation  Is this a readmission?: No    Elopement Risk  Legal Hold: No  Ambulatory or Self Mobile in Wheelchair: No-Not an Elopement Risk  Elopement Risk: Not at Risk for Elopement    Interdisciplinary Discharge Planning  Lives with - Patient's Self Care Capacity: Alone and Able to Care For Self  Patient or legal guardian wants to designate a caregiver (see row info): No  Housing / Facility: Unable To Determine At This Time  Prior Services: None    Discharge Preparedness  What is your plan after discharge?: Uncertain - pending medical team collaboration  What are your discharge supports?: Other (comment)  Prior Functional Level: Ambulatory, Drives Self, Independent with Activities of Daily Living, Independent with Medication Management  Difficulity with ADLs: Walking, None  Difficulity with IADLs: Cooking, Driving, Laundry, Shopping    Functional Assesment  Prior Functional Level: Ambulatory, Drives Self, Independent with Activities " of Daily Living, Independent with Medication Management    Finances  Financial Barriers to Discharge: No  Prescription Coverage: Yes    Vision / Hearing Impairment  Vision Impairment : No  Hearing Impairment : No    Values / Beliefs / Concerns  Values / Beliefs Concerns : No  Special Hospitalization Concerns: none    Advance Directive  Advance Directive?: None    Domestic Abuse  Have you ever been the victim of abuse or violence?: No  Physical Abuse or Sexual Abuse: No  Verbal Abuse or Emotional Abuse: No    Psychological Assessment  History of Substance Abuse: None  History of Psychiatric Problems: No  Non-compliant with Treatment: No  Newly Diagnosed Illness: Yes    Discharge Risks or Barriers  Discharge risks or barriers?: No PCP, Uninsured / underinsured, Post-acute placement / services  Patient risk factors: Homeless, Lack of outside supports, No PCP    Anticipated Discharge Information  Anticipated discharge disposition: Discharge needs currently unknown

## 2019-01-31 NOTE — PROGRESS NOTES
2 Rn skin check on admit. No areas of skin breakdown or pressure injury.     Pt at risk for skin breakdown r/t bedrest and injury. Pt turned minimum q 2 hr while on bedrest, skin assessed over bony prominences, skin protectant applied, pillows placed. Assessed for efficacy of interventions frequently.

## 2019-01-31 NOTE — THERAPY
"Occupational Therapy Evaluation completed.   Functional Status:  Supervised with ADLs and txfs  Plan of Care: Patient with no further skilled OT needs in the acute care setting at this time  Discharge Recommendations:Post-acute therapy Currently anticipate no further skilled therapy needs once patient is discharged from the inpatient setting.    See \"Rehab Therapy-Acute\" Patient Summary Report for complete documentation.    "

## 2019-01-31 NOTE — PROGRESS NOTES
Trauma / Surgical Daily Progress Note    Date of Service  1/31/2019    Chief Complaint  28 y.o. male admitted 1/30/2019 with SAH (subarachnoid hemorrhage) (HCC)  Fall in Ice        Interval Events  Medically cleared for transfer to Neurosurgery.   Tertiary survey completed, with no further findings  RAP/SBIRT completed and documented in EPIC    Review of Systems  Review of Systems   Constitutional: Negative for fever.   HENT: Negative for hearing loss.    Eyes: Negative for double vision.   Respiratory: Negative for shortness of breath.    Cardiovascular: Negative for chest pain.   Skin: Negative for rash.        Vital Signs  Temp:  [36.2 °C (97.2 °F)-37.8 °C (100.1 °F)] 36.2 °C (97.2 °F)  Pulse:  [46-85] 49  Resp:  [9-37] 12  SpO2:  [91 %-100 %] 96 %    Physical Exam  Physical Exam    Laboratory  Recent Results (from the past 24 hour(s))   CBC with Differential: Tomorrow AM    Collection Time: 01/31/19  5:55 AM   Result Value Ref Range    WBC 9.3 4.8 - 10.8 K/uL    RBC 4.59 (L) 4.70 - 6.10 M/uL    Hemoglobin 13.0 (L) 14.0 - 18.0 g/dL    Hematocrit 41.0 (L) 42.0 - 52.0 %    MCV 89.3 81.4 - 97.8 fL    MCH 28.3 27.0 - 33.0 pg    MCHC 31.7 (L) 33.7 - 35.3 g/dL    RDW 40.6 35.9 - 50.0 fL    Platelet Count 187 164 - 446 K/uL    MPV 10.9 9.0 - 12.9 fL    Neutrophils-Polys 69.20 44.00 - 72.00 %    Lymphocytes 16.10 (L) 22.00 - 41.00 %    Monocytes 12.90 0.00 - 13.40 %    Eosinophils 1.20 0.00 - 6.90 %    Basophils 0.30 0.00 - 1.80 %    Immature Granulocytes 0.30 0.00 - 0.90 %    Nucleated RBC 0.00 /100 WBC    Neutrophils (Absolute) 6.45 1.82 - 7.42 K/uL    Lymphs (Absolute) 1.50 1.00 - 4.80 K/uL    Monos (Absolute) 1.20 (H) 0.00 - 0.85 K/uL    Eos (Absolute) 0.11 0.00 - 0.51 K/uL    Baso (Absolute) 0.03 0.00 - 0.12 K/uL    Immature Granulocytes (abs) 0.03 0.00 - 0.11 K/uL    NRBC (Absolute) 0.00 K/uL   Comp Metabolic Panel (CMP): Tomorrow AM    Collection Time: 01/31/19  5:55 AM   Result Value Ref Range    Sodium 133 (L)  135 - 145 mmol/L    Potassium 4.1 3.6 - 5.5 mmol/L    Chloride 103 96 - 112 mmol/L    Co2 24 20 - 33 mmol/L    Anion Gap 6.0 0.0 - 11.9    Glucose 100 (H) 65 - 99 mg/dL    Bun 16 8 - 22 mg/dL    Creatinine 0.88 0.50 - 1.40 mg/dL    Calcium 9.1 8.5 - 10.5 mg/dL    AST(SGOT) 29 12 - 45 U/L    ALT(SGPT) 33 2 - 50 U/L    Alkaline Phosphatase 43 30 - 99 U/L    Total Bilirubin 0.9 0.1 - 1.5 mg/dL    Albumin 3.5 3.2 - 4.9 g/dL    Total Protein 5.7 (L) 6.0 - 8.2 g/dL    Globulin 2.2 1.9 - 3.5 g/dL    A-G Ratio 1.6 g/dL   MAGNESIUM    Collection Time: 01/31/19  5:55 AM   Result Value Ref Range    Magnesium 1.6 1.5 - 2.5 mg/dL   PHOSPHORUS    Collection Time: 01/31/19  5:55 AM   Result Value Ref Range    Phosphorus 2.5 2.5 - 4.5 mg/dL   ESTIMATED GFR    Collection Time: 01/31/19  5:55 AM   Result Value Ref Range    GFR If African American >60 >60 mL/min/1.73 m 2    GFR If Non African American >60 >60 mL/min/1.73 m 2       Fluids    Intake/Output Summary (Last 24 hours) at 01/31/19 1319  Last data filed at 01/31/19 1200   Gross per 24 hour   Intake          2912.92 ml   Output              775 ml   Net          2137.92 ml       Core Measures & Quality Metrics  Core Measures & Quality Metrics  ORLIN Score  ETOH Screening  CAGE Score: 0  Intervention complete date: 1/31/2019  Patient response to intervention: I drink twice per week, socially.   Patient demonstrats understanding of intervention.Plan of care:    has not been contacted.Follow up with: PCP  Total ETOH intervention time: 15 - 30 mintues      Assessment/Plan  ICB (intracranial bleed) (HCC)- (present on admission)   Assessment & Plan    Referring facility CT head shows right frontal intraparenchymal 2cm hemorrhage with a smaller right frontal convexity 11mm.   Frontal falx subarachnoid hemorrhage extending to the left 5mm with a right to left shift  Small frontal floor extra-axial blood noted.   Repeat CT imaging withn o significant interval  change  Keppra x 7 days (2/5/19)  Non-operative management.  Russell Lara MD. Neurosurgery.     Skull fracture (HCC)- (present on admission)   Assessment & Plan    Referring facility CT head shows post-occipital skull fracture.  Non-operative management.  Russell Lara MD. Neurosurgery     Contraindication to deep vein thrombosis (DVT) prophylaxis- (present on admission)   Assessment & Plan    Systemic anticoagulation contraindicated secondary to elevated bleeding risk.  1/30 Surveillance venous duplex scanning if unable to initiate chemical DVT prophylaxis within 48 hrs of admission     Trauma- (present on admission)   Assessment & Plan    Fall on ice, striking back of head. (+)loss of consciousness  Trauma Green Transfer Activation.  Kristin Salvador MD, Trauma/General Surgery.         Discussed patient condition with Family, RN, Patient and trauma surgery.

## 2019-01-31 NOTE — PROGRESS NOTES
Pt transported to CT with ACLS RN and CCT.     00:13 Pt returned to S124. Resting comfortably in bed. VSS.

## 2019-01-31 NOTE — CARE PLAN
Problem: Communication  Goal: The ability to communicate needs accurately and effectively will improve  Outcome: PROGRESSING AS EXPECTED  Pt able to voice feelings and needs. Patient oriented. Education provided on pt's environment and the need to call for help.         Problem: Safety  Goal: Will remain free from injury  Outcome: PROGRESSING AS EXPECTED  PT turns self in bed.

## 2019-02-01 ENCOUNTER — APPOINTMENT (OUTPATIENT)
Dept: RADIOLOGY | Facility: MEDICAL CENTER | Age: 29
DRG: 083 | End: 2019-02-01
Attending: NURSE PRACTITIONER

## 2019-02-01 PROCEDURE — A9270 NON-COVERED ITEM OR SERVICE: HCPCS | Performed by: SURGERY

## 2019-02-01 PROCEDURE — 770001 HCHG ROOM/CARE - MED/SURG/GYN PRIV*

## 2019-02-01 PROCEDURE — 700102 HCHG RX REV CODE 250 W/ 637 OVERRIDE(OP): Performed by: SURGERY

## 2019-02-01 PROCEDURE — 700112 HCHG RX REV CODE 229: Performed by: SURGERY

## 2019-02-01 PROCEDURE — 99233 SBSQ HOSP IP/OBS HIGH 50: CPT | Performed by: SURGERY

## 2019-02-01 PROCEDURE — 93970 EXTREMITY STUDY: CPT | Mod: 26 | Performed by: SURGERY

## 2019-02-01 RX ADMIN — POLYETHYLENE GLYCOL 3350 1 PACKET: 17 POWDER, FOR SOLUTION ORAL at 18:52

## 2019-02-01 RX ADMIN — BUTALBITAL, ACETAMINOPHEN, AND CAFFEINE 2 TABLET: 50; 325; 40 TABLET ORAL at 19:28

## 2019-02-01 RX ADMIN — BUTALBITAL, ACETAMINOPHEN, AND CAFFEINE 2 TABLET: 50; 325; 40 TABLET ORAL at 13:05

## 2019-02-01 RX ADMIN — OXYCODONE HYDROCHLORIDE 5 MG: 5 TABLET ORAL at 23:53

## 2019-02-01 RX ADMIN — LEVETIRACETAM 500 MG: 500 TABLET, FILM COATED ORAL at 21:04

## 2019-02-01 RX ADMIN — LEVETIRACETAM 500 MG: 500 TABLET, FILM COATED ORAL at 05:38

## 2019-02-01 RX ADMIN — BUTALBITAL, ACETAMINOPHEN, AND CAFFEINE 2 TABLET: 50; 325; 40 TABLET ORAL at 07:04

## 2019-02-01 RX ADMIN — OXYCODONE HYDROCHLORIDE 5 MG: 5 TABLET ORAL at 18:53

## 2019-02-01 RX ADMIN — BUTALBITAL, ACETAMINOPHEN, AND CAFFEINE 2 TABLET: 50; 325; 40 TABLET ORAL at 00:24

## 2019-02-01 RX ADMIN — DOCUSATE SODIUM 100 MG: 100 CAPSULE, LIQUID FILLED ORAL at 18:00

## 2019-02-01 ASSESSMENT — ENCOUNTER SYMPTOMS
DOUBLE VISION: 0
SHORTNESS OF BREATH: 0
FEVER: 0

## 2019-02-01 ASSESSMENT — LIFESTYLE VARIABLES: SUBSTANCE_ABUSE: 0

## 2019-02-01 NOTE — PROGRESS NOTES
Patient to be transferred to S143.  Patient and family updated.  Report called to Sunita.  All questions answered.

## 2019-02-01 NOTE — CARE PLAN
Problem: Communication  Goal: The ability to communicate needs accurately and effectively will improve    Intervention: Educate patient and significant other/support system about the plan of care, procedures, treatments, medications and allow for questions  Patient updated on POC.  All questions answered.       Problem: Safety  Goal: Will remain free from falls    Intervention: Implement fall precautions  Patient educated on calling RN before getting out of bed.  Bed in lowest position, call light within reach, treaded socks on patient and bed alarm activated.

## 2019-02-01 NOTE — CARE PLAN
Problem: Pain Management  Goal: Pain level will decrease to patient's comfort goal  Outcome: PROGRESSING AS EXPECTED  Assessed patient's pain via 0-10 scale, medicated per MAR.       Problem: Skin Integrity  Goal: Skin Integrity is maintained or improved  Outcome: PROGRESSING AS EXPECTED  Skin assessment complete. Appropriate barriers in place. Pt turns self.

## 2019-02-01 NOTE — PROGRESS NOTES
Trauma / Surgical Daily Progress Note    Date of Service  2/1/2019    Chief Complaint  28 y.o. male admitted 1/30/2019 with SAH (subarachnoid hemorrhage) ([facility])    Interval Events  Transfer orders for 24 hours to Neurosurgery unit.  No overnight events.   Headache continues, with minimal relief from pain medications.  Photophobia     Review of Systems  Review of Systems   Constitutional: Negative for fever.   HENT: Negative for hearing loss.    Eyes: Negative for double vision.   Respiratory: Negative for shortness of breath.    Cardiovascular: Negative for chest pain.   Genitourinary:        Voiding     Skin: Negative for rash.   Psychiatric/Behavioral: Negative for substance abuse.        Vital Signs  Temp:  [36.2 °C (97.2 °F)-37.4 °C (99.4 °F)] 37.4 °C (99.4 °F)  Pulse:  [46-85] 51  Resp:  [9-26] 26  SpO2:  [94 %-98 %] 95 %    Physical Exam  Physical Exam   Constitutional: He appears well-nourished.   HENT:   Head: Normocephalic.   Eyes: Pupils are equal, round, and reactive to light.   Cardiovascular: Regular rhythm.    Pulmonary/Chest: No respiratory distress.   Abdominal: Soft. There is no tenderness.   Musculoskeletal: Normal range of motion.   Neurological: He is alert. GCS eye subscore is 4. GCS verbal subscore is 5. GCS motor subscore is 6.   Skin: Skin is warm and dry. He is not diaphoretic.       Laboratory  No results found for this or any previous visit (from the past 24 hour(s)).    Fluids    Intake/Output Summary (Last 24 hours) at 02/01/19 0938  Last data filed at 02/01/19 0400   Gross per 24 hour   Intake             1860 ml   Output             2275 ml   Net             -415 ml       Core Measures & Quality Metrics    Shi catheter: No Shi      DVT Prophylaxis: Contraindicated - High bleeding risk    Ulcer prophylaxis: Not indicated    Assessed for rehab: Patient returned to prior level of function, rehabilitation not indicated at this time    Total Score: 3    ETOH Screening  CAGE Score:  0  Intervention complete date: 1/31/2019  Patient response to intervention: I drink twice per week, socially.   Patient demonstrats understanding of intervention.Plan of care:    has not been contacted.Follow up with: PCP  Total ETOH intervention time: 15 - 30 mintues      Assessment/Plan  ICB (intracranial bleed) (HCC)- (present on admission)   Assessment & Plan    Referring facility CT head shows right frontal intraparenchymal 2cm hemorrhage with a smaller right frontal convexity 11mm.   Frontal falx subarachnoid hemorrhage extending to the left 5mm with a right to left shift  Small frontal floor extra-axial blood noted.   Repeat CT imaging withn o significant interval change.  Keppra x 7 days (2/5/19)  Non-operative management.  Russell Lara MD. Neurosurgery.     Skull fracture (HCC)- (present on admission)   Assessment & Plan    Referring facility CT head shows post-occipital skull fracture.  Non-operative management.  Russell Lara MD. Neurosurgery     Contraindication to deep vein thrombosis (DVT) prophylaxis- (present on admission)   Assessment & Plan    Systemic anticoagulation contraindicated secondary to elevated bleeding risk.  1/30 Surveillance venous duplex scanning if unable to initiate chemical DVT prophylaxis within 48 hrs of admission     Trauma- (present on admission)   Assessment & Plan    Fall on ice, striking back of head. (+)loss of consciousness  Trauma Green Transfer Activation.  Kristin Salvador MD, Trauma/General Surgery.         Discussed patient condition with RN, Patient and trauma surgery.   25 minutes   Patient seen, data reviewed and discussed.  Agree with assessment and plan.   The APN and I have collaborated in the patient assessment chart documentation and care plan. The clinical decision making , diagnoses and management are mine and the APN has assisted in the creation of the clinical document . The APN has no independent billing for this patient.      I  have personally evaluated this patient at the bedside and performed a global high level assessment to allow clinical decision making regarding the patient's risk tolerance for transfer to a lower level of care in this in-house risk environment. This decision and takes into account the patient's current active clinical problems and  Comorbidities. This includes:  Complete neurologic assessment  Assessment of respiratory function considering the need his ongoing therapies and the patient's tolerance'  Cardiovascular status  Coordination of care needs

## 2019-02-02 PROCEDURE — 770001 HCHG ROOM/CARE - MED/SURG/GYN PRIV*

## 2019-02-02 PROCEDURE — A9270 NON-COVERED ITEM OR SERVICE: HCPCS | Performed by: NURSE PRACTITIONER

## 2019-02-02 PROCEDURE — 700102 HCHG RX REV CODE 250 W/ 637 OVERRIDE(OP): Performed by: NURSE PRACTITIONER

## 2019-02-02 PROCEDURE — A9270 NON-COVERED ITEM OR SERVICE: HCPCS | Performed by: SURGERY

## 2019-02-02 PROCEDURE — 700102 HCHG RX REV CODE 250 W/ 637 OVERRIDE(OP): Performed by: SURGERY

## 2019-02-02 PROCEDURE — 700112 HCHG RX REV CODE 229: Performed by: SURGERY

## 2019-02-02 RX ORDER — OXYCODONE HYDROCHLORIDE 10 MG/1
10 TABLET ORAL
Status: DISCONTINUED | OUTPATIENT
Start: 2019-02-02 | End: 2019-02-03

## 2019-02-02 RX ORDER — OXYCODONE HYDROCHLORIDE 5 MG/1
5 TABLET ORAL
Status: DISCONTINUED | OUTPATIENT
Start: 2019-02-02 | End: 2019-02-03

## 2019-02-02 RX ORDER — OXYCODONE HCL 10 MG/1
10 TABLET, FILM COATED, EXTENDED RELEASE ORAL
Status: DISCONTINUED | OUTPATIENT
Start: 2019-02-02 | End: 2019-02-02

## 2019-02-02 RX ORDER — LACTULOSE 20 G/30ML
45 SOLUTION ORAL ONCE
Status: COMPLETED | OUTPATIENT
Start: 2019-02-02 | End: 2019-02-02

## 2019-02-02 RX ADMIN — LEVETIRACETAM 500 MG: 500 TABLET, FILM COATED ORAL at 06:27

## 2019-02-02 RX ADMIN — LACTULOSE 45 ML: 20 SOLUTION ORAL at 09:42

## 2019-02-02 RX ADMIN — MAGNESIUM HYDROXIDE 30 ML: 400 SUSPENSION ORAL at 06:29

## 2019-02-02 RX ADMIN — POLYETHYLENE GLYCOL 3350 1 PACKET: 17 POWDER, FOR SOLUTION ORAL at 06:29

## 2019-02-02 RX ADMIN — OXYCODONE HYDROCHLORIDE 10 MG: 10 TABLET ORAL at 22:54

## 2019-02-02 RX ADMIN — BUTALBITAL, ACETAMINOPHEN, AND CAFFEINE 2 TABLET: 50; 325; 40 TABLET ORAL at 21:38

## 2019-02-02 RX ADMIN — OXYCODONE HYDROCHLORIDE 5 MG: 5 TABLET ORAL at 18:08

## 2019-02-02 RX ADMIN — OXYCODONE HYDROCHLORIDE 5 MG: 5 TABLET ORAL at 11:10

## 2019-02-02 RX ADMIN — BUTALBITAL, ACETAMINOPHEN, AND CAFFEINE 2 TABLET: 50; 325; 40 TABLET ORAL at 01:33

## 2019-02-02 RX ADMIN — OXYCODONE HYDROCHLORIDE 5 MG: 5 TABLET ORAL at 06:30

## 2019-02-02 RX ADMIN — BUTALBITAL, ACETAMINOPHEN, AND CAFFEINE 2 TABLET: 50; 325; 40 TABLET ORAL at 06:27

## 2019-02-02 RX ADMIN — BUTALBITAL, ACETAMINOPHEN, AND CAFFEINE 2 TABLET: 50; 325; 40 TABLET ORAL at 13:17

## 2019-02-02 RX ADMIN — LEVETIRACETAM 500 MG: 500 TABLET, FILM COATED ORAL at 18:08

## 2019-02-02 RX ADMIN — DOCUSATE SODIUM 100 MG: 100 CAPSULE, LIQUID FILLED ORAL at 06:27

## 2019-02-02 ASSESSMENT — ENCOUNTER SYMPTOMS
MUSCULOSKELETAL NEGATIVE: 1
RESPIRATORY NEGATIVE: 1
PHOTOPHOBIA: 1
CONSTIPATION: 1
HEADACHES: 1
ROS GI COMMENTS: BM PTA

## 2019-02-02 NOTE — PROGRESS NOTES
2 RN skin check with Lisette, RN    Pts skin is grossly intact. Ears, elbows, sacrum, heels are pink and blanching

## 2019-02-02 NOTE — PROGRESS NOTES
RN MOBILITY NOTE     Surgery patient?: no  Date of surgery: n/a  Ambulated 50 ft on day of surgery? (N/A if today is not date of surgery): n/a  Number of times ambulated 50 feet or greater today: 0  Patient has been up to chair, edge of bed or HOB 90 degrees for all meals?: yes  Goal met? (goal is ambulating at least 50 feet 2 times on day shift, one time on night shift): no  If patient did not meet mobility goal, why?: unable to tolerate d/t severe headache

## 2019-02-02 NOTE — PROGRESS NOTES
"Pt A/Ox4. Pt complaining of 10/10 headache w/ the lights on, \"all over\". Pt has severe photophobia. IVs present and patent. Pt visibly agitated, no further complaints at this time. Call light within reach, educated on use.  "

## 2019-02-02 NOTE — PROGRESS NOTES
Patient transferred with Transport via gurney with belongings.  All medications and patient's juice sent with him.

## 2019-02-02 NOTE — PROGRESS NOTES
0650 Patient's in bed. Bedside report received from NOC RN (Lisette) at the beginning of the shift.    0912 New order received & acknowledged from YULIA Bailey.    1015 Patient's in bed, patient has severe photophobia, wants door & blinds shut. Fall Protocol in effect. Call light within reach. Reminded patient to call for asssist.  Assessment completed. No distress noted. Plan of care reviewed with the patient.    1110 Patient's in bed, medicated with Oxycodone (see MAR) for c/o's headache, rates pain 9/10. Brother visiting.    1317 Patient's in bed, brother visiting. Medicated with Fioricet (see MAR) for c/o's headache, rates pain 10/10.

## 2019-02-02 NOTE — PROGRESS NOTES
Neurosurgery Progress Note    Subjective:  Headache & photophobia persistent  Uncooperative at times per nursing, recently up to the bathroom  Exam:  Laying in bed with eye mask  Oriented- tongue ML  Follows commands    BP  Min: 99/46  Max: 120/58  Pulse  Av.7  Min: 44  Max: 62  Resp  Av.3  Min: 13  Max: 24  Temp  Av.2 °C (98.9 °F)  Min: 36.4 °C (97.6 °F)  Max: 37.5 °C (99.5 °F)  SpO2  Av.3 %  Min: 94 %  Max: 98 %    No Data Recorded    Recent Labs      19   0555   WBC  9.3   RBC  4.59*   HEMOGLOBIN  13.0*   HEMATOCRIT  41.0*   MCV  89.3   MCH  28.3   MCHC  31.7*   RDW  40.6   PLATELETCT  187   MPV  10.9     Recent Labs      19   0555   SODIUM  133*   POTASSIUM  4.1   CHLORIDE  103   CO2  24   GLUCOSE  100*   BUN  16   CREATININE  0.88   CALCIUM  9.1               Intake/Output       19 0700 - 19 0659 19 07 - 19 0659      [id number] 0209-8316 Total 4190-6210 9567-6541 Total       Intake    P.O.  160  -- 160  --  -- --    P.O. 160 -- 160 -- -- --    I.V.  400  -- 400  --  -- --    Volume (mL) (NS infusion) 400 -- 400 -- -- --    Total Intake 560 -- 560 -- -- --       Output    Urine  601  -- 601  --  -- --    Number of Times Voided 501 x -- 501 x -- -- --    Urine Void (mL) 601 -- 601 -- -- --    Stool  --  -- --  --  -- --    Number of Times Stooled -- -- -- 1 x -- 1 x    Total Output 601 -- 601 -- -- --       Net I/O     -41 -- -41 -- -- --            Intake/Output Summary (Last 24 hours) at 19 1146  Last data filed at 19 1849   Gross per 24 hour   Intake              280 ml   Output              601 ml   Net             -321 ml            • acetaminophen/caffeine/butalbital 325-40-50 mg  2 Tab Q6HRS PRN   • oxyCODONE immediate-release  5 mg Q4HRS PRN   • levETIRAcetam  500 mg BID   • Respiratory Care per Protocol   Continuous RT   • Pharmacy Consult Request  1 Each PHARMACY TO DOSE   • docusate sodium  100 mg BID   • senna-docusate  1 Tab Q24HRS  PRN   • polyethylene glycol/lytes  1 Packet BID   • magnesium hydroxide  30 mL DAILY   • bisacodyl  10 mg Q24HRS PRN   • fleet  1 Each Once PRN   • ondansetron  4 mg Q4HRS PRN       Assessment and Plan:  Hospital day #4  POD #NA  Prophylactic anticoagulation: no         Start date/time: tbd  Stable bifrontal contusions on repeat scan 1/31  Neuro stable  mobilize as tolerated  No neurosurgical intervention at this time

## 2019-02-02 NOTE — CARE PLAN
Problem: Safety  Goal: Will remain free from injury  Outcome: PROGRESSING AS EXPECTED  Safety precaution in effect. Non skid socks in use. Call light within reach. Reminded patient to call for assist. Hourly rounds in effect. Verbalized understanding.    Problem: Infection  Goal: Will remain free from infection  Outcome: PROGRESSING AS EXPECTED  Implement standard precaution. Hand washing every encounter & before & after patient care. Verbalized understanding.    Problem: Knowledge Deficit  Goal: Knowledge of disease process/condition, treatment plan, diagnostic tests, and medications will improve  Outcome: PROGRESSING AS EXPECTED  Discussed Plan of care. Questions answered. Verbalized understanding.    Problem: Pain Management  Goal: Pain level will decrease to patient's comfort goal    Intervention: Follow pain managment plan developed in collaboration with patient and Interdisciplinary Team  Outcome : Progressing as expected.                    Educated on pain scale. Encouraged to verbalize pain. Will medicate as per MAR.

## 2019-02-02 NOTE — PROGRESS NOTES
Spoke with brother Harjinder in regard to POC. Brother expresses concern with taking care of pt at home and wants to discuss POC with MD. Requested MD to call him @ [phone] if not able to discuss with him in person. Brother expresses concern about pts social history and would like to talk about meds to be prescribed to pt upon d/c and how to care for pt at home. Brother at bedside.

## 2019-02-02 NOTE — PROGRESS NOTES
Neurosurgery Progress Note    Subjective:  Headache & photophobia persistent    Exam:  A&O, speech fluent & appropriate  Strength 5/5 bilaterally UE & LE, sensation grossly intact  Follows commands    BP  Min: 119/64  Max: 119/64  Pulse  Av.4  Min: 44  Max: 85  Resp  Av.7  Min: 11  Max: 31  Temp  Av.1 °C (98.8 °F)  Min: 36.4 °C (97.6 °F)  Max: 37.4 °C (99.4 °F)  SpO2  Av.4 %  Min: 94 %  Max: 99 %    No Data Recorded    Recent Labs      19   0607  19   0555   WBC  12.3*  9.3   RBC  5.11  4.59*   HEMOGLOBIN  14.5  13.0*   HEMATOCRIT  44.0  41.0*   MCV  86.1  89.3   MCH  28.4  28.3   MCHC  33.0*  31.7*   RDW  40.1  40.6   PLATELETCT  255  187   MPV  10.2  10.9     Recent Labs      19   0607  19   0555   SODIUM  139  133*   POTASSIUM  3.8  4.1   CHLORIDE  107  103   CO2  22  24   GLUCOSE  97  100*   BUN  20  16   CREATININE  0.89  0.88   CALCIUM  9.0  9.1     Recent Labs      19   0607   APTT  26.1   INR  1.20*     Recent Labs      19   0607   REACTMIN  5.0   CLOTKINET  1.7   CLOTANGL  66.1   MAXCLOTS  69.1   JDV68YBD  1.0   PRCINADP  76.9   PRCINAA  0.0       Intake/Output       19 - 19 - 1959       Total  Total       Intake    P.O.  --  750 750  160  -- 160    P.O. -- 750 750 160 -- 160    I.V.  550  600 1150  400  -- 400    Magnesium Sulfate Volume 50 -- 50 -- -- --    Volume (mL) (NS infusion)  400 -- 400    Other  60  -- 60  --  -- --    Medications (PO/Enteral Liquids) 60 -- 60 -- -- --    IV Piggyback  53.3  -- 53.3  --  -- --    Volume (mL) (levETIRAcetam (KEPPRA) 500 mg in  mL IVPB) 53.3 -- 53.3 -- -- --    Total Intake 663.3 1350 2013.3 560 -- 560       Output    Urine  500  1775 2275  600  -- 600    Number of Times Voided -- 3 x 3 x 1 x -- 1 x    Urine Void (mL) 500 1775 2275 600 -- 600    Total Output 500 1775 2275 600 -- 600       Net I/O     163.3 -425  -261.7 -40 -- -40            Intake/Output Summary (Last 24 hours) at 02/01/19 1813  Last data filed at 02/01/19 1400   Gross per 24 hour   Intake             1910 ml   Output             2375 ml   Net             -465 ml            • acetaminophen/caffeine/butalbital 325-40-50 mg  2 Tab Q6HRS PRN   • oxyCODONE immediate-release  5 mg Q4HRS PRN   • levETIRAcetam  500 mg BID   • Respiratory Care per Protocol   Continuous RT   • Pharmacy Consult Request  1 Each PHARMACY TO DOSE   • docusate sodium  100 mg BID   • senna-docusate  1 Tab Q24HRS PRN   • polyethylene glycol/lytes  1 Packet BID   • magnesium hydroxide  30 mL DAILY   • bisacodyl  10 mg Q24HRS PRN   • fleet  1 Each Once PRN   • NS   Continuous   • ondansetron  4 mg Q4HRS PRN       Assessment and Plan:  Hospital day #3  POD #NA  Prophylactic anticoagulation: no         Start date/time: tbd  Stable bifrontal contusions on repeat scan  Neuro stable  mobilize as tolerated  No neurosurgical intervention at this time  Seen in conjunction with Dr Lara

## 2019-02-02 NOTE — CARE PLAN
Problem: Safety  Goal: Will remain free from injury  Outcome: PROGRESSING SLOWER THAN EXPECTED  Pt has remained free from injury this visit. Pt calls for help appropriately.    Problem: Infection  Goal: Will remain free from infection  Outcome: PROGRESSING SLOWER THAN EXPECTED  No s/s of infection.    Problem: Pain Management  Goal: Pain level will decrease to patient's comfort goal  Outcome: PROGRESSING AS EXPECTED  Despite intervention, pt has unresolved headache pain.

## 2019-02-02 NOTE — PROGRESS NOTES
Trauma / Surgical Daily Progress Note    Date of Service  2/2/2019    Chief Complaint  28 y.o. male admitted 1/30/2019 as a trauma green transfer - fall on ice - non op SAH  HD # 3    Interval Events  Constipation addressed   Headache and photophobia persistent  Discussed pain control and expectations  Slow to progress     Review of Systems  Review of Systems   Constitutional: Positive for malaise/fatigue.   HENT: Negative.    Eyes: Positive for photophobia.   Respiratory: Negative.    Gastrointestinal: Positive for constipation.        BM PTA   Genitourinary:        Voiding   Musculoskeletal: Negative.    Skin: Negative.    Neurological: Positive for headaches.   All other systems reviewed and are negative.       Vital Signs  Temp:  [36.4 °C (97.6 °F)-37.5 °C (99.5 °F)] 37.5 °C (99.5 °F)  Pulse:  [44-62] 60  Resp:  [13-24] 19  BP: ()/(46-64) 114/53  SpO2:  [94 %-98 %] 96 %    Physical Exam  Physical Exam   Constitutional: He appears well-developed and well-nourished.   Refuses to speak or open eyes secondary to pain    Neck: Normal range of motion.   Cardiovascular: Normal rate and regular rhythm.    Pulmonary/Chest: Breath sounds normal. No respiratory distress.   Abdominal: Soft. There is no tenderness.   Musculoskeletal: Normal range of motion.   Neurological: He is alert.   Skin: Skin is warm and dry.   Psychiatric:   Disengaged    Nursing note and vitals reviewed.      Laboratory  No results found for this or any previous visit (from the past 24 hour(s)).    Fluids    Intake/Output Summary (Last 24 hours) at 02/02/19 0910  Last data filed at 02/01/19 1849   Gross per 24 hour   Intake              380 ml   Output              601 ml   Net             -221 ml       Core Measures & Quality Metrics  Medications reviewed  Shi catheter: No Shi      DVT Prophylaxis: Contraindicated - High bleeding risk    Ulcer prophylaxis: Not indicated    Assessed for rehab: Patient returned to prior level of function,  rehabilitation not indicated at this time    Total Score: 3    ETOH Screening  CAGE Score: 0  Intervention complete date: 1/31/2019  Patient response to intervention: I drink twice per week, socially .   Patient demonstrats understanding of intervention.Plan of care:    has not been contacted.Follow up with: PCP  Total ETOH intervention time: 15 - 30 mintues      Assessment/Plan  ICB (intracranial bleed) (HCC)- (present on admission)   Assessment & Plan    Referring facility CT head shows right frontal intraparenchymal 2cm hemorrhage with a smaller right frontal convexity 11mm.   Frontal falx subarachnoid hemorrhage extending to the left 5mm with a right to left shift  Small frontal floor extra-axial blood noted.   Repeat CT imaging with no significant interval change.  Keppra x 7 days (2/5/19)  Non-operative management.   Russell Lara MD. Neurosurgery.     Skull fracture (HCC)- (present on admission)   Assessment & Plan    Referring facility CT head shows post-occipital skull fracture.  Non-operative management.  Russell Lara MD. Neurosurgery     Contraindication to deep vein thrombosis (DVT) prophylaxis- (present on admission)   Assessment & Plan    Systemic anticoagulation contraindicated secondary to elevated bleeding risk.  1/30 Surveillance venous duplex scanning if unable to initiate chemical DVT prophylaxis within 48 hrs of admission     Trauma- (present on admission)   Assessment & Plan    Fall on ice, striking back of head. (+)loss of consciousness  Trauma Green Transfer Activation.  Kristin Salvador MD, Trauma/General Surgery.     Discussed patient condition with RN, Patient and trauma surgery. Dr. Salvador

## 2019-02-03 PROCEDURE — 700102 HCHG RX REV CODE 250 W/ 637 OVERRIDE(OP): Performed by: NURSE PRACTITIONER

## 2019-02-03 PROCEDURE — A9270 NON-COVERED ITEM OR SERVICE: HCPCS | Performed by: NURSE PRACTITIONER

## 2019-02-03 PROCEDURE — 700102 HCHG RX REV CODE 250 W/ 637 OVERRIDE(OP): Performed by: SURGERY

## 2019-02-03 PROCEDURE — 770001 HCHG ROOM/CARE - MED/SURG/GYN PRIV*

## 2019-02-03 PROCEDURE — A9270 NON-COVERED ITEM OR SERVICE: HCPCS | Performed by: SURGERY

## 2019-02-03 RX ORDER — OXYCODONE HYDROCHLORIDE 5 MG/1
5-10 TABLET ORAL
Status: DISCONTINUED | OUTPATIENT
Start: 2019-02-03 | End: 2019-02-08 | Stop reason: HOSPADM

## 2019-02-03 RX ADMIN — OXYCODONE HYDROCHLORIDE 10 MG: 10 TABLET ORAL at 04:15

## 2019-02-03 RX ADMIN — BUTALBITAL, ACETAMINOPHEN, AND CAFFEINE 2 TABLET: 50; 325; 40 TABLET ORAL at 04:14

## 2019-02-03 RX ADMIN — OXYCODONE HYDROCHLORIDE 10 MG: 5 TABLET ORAL at 12:44

## 2019-02-03 RX ADMIN — OXYCODONE HYDROCHLORIDE 10 MG: 5 TABLET ORAL at 18:43

## 2019-02-03 RX ADMIN — BUTALBITAL, ACETAMINOPHEN, AND CAFFEINE 2 TABLET: 50; 325; 40 TABLET ORAL at 18:43

## 2019-02-03 RX ADMIN — OXYCODONE HYDROCHLORIDE 10 MG: 5 TABLET ORAL at 15:44

## 2019-02-03 RX ADMIN — OXYCODONE HYDROCHLORIDE 10 MG: 5 TABLET ORAL at 08:10

## 2019-02-03 RX ADMIN — BUTALBITAL, ACETAMINOPHEN, AND CAFFEINE 2 TABLET: 50; 325; 40 TABLET ORAL at 12:44

## 2019-02-03 RX ADMIN — LEVETIRACETAM 500 MG: 500 TABLET, FILM COATED ORAL at 04:14

## 2019-02-03 RX ADMIN — OXYCODONE HYDROCHLORIDE 10 MG: 5 TABLET ORAL at 21:45

## 2019-02-03 RX ADMIN — LEVETIRACETAM 500 MG: 500 TABLET, FILM COATED ORAL at 18:43

## 2019-02-03 ASSESSMENT — ENCOUNTER SYMPTOMS
PHOTOPHOBIA: 1
CONSTIPATION: 0
MUSCULOSKELETAL NEGATIVE: 1
RESPIRATORY NEGATIVE: 1

## 2019-02-03 NOTE — CARE PLAN
Problem: Safety  Goal: Will remain free from injury  Outcome: PROGRESSING AS EXPECTED  Pt has remained free from injury this visit. Fall precautions in place, pt educated on fall risk. Pt does not call for help consistently, reinforced fall risk and educated pt on need to call for help when ambulating. Pt verbalizes understanding.    Problem: Pain Management  Goal: Pain level will decrease to patient's comfort goal  Outcome: PROGRESSING SLOWER THAN EXPECTED  Pt reports unresolved pain. Pt educated on pain management options. Trauma NP contacted, new orders placed. Pt updated on change.

## 2019-02-03 NOTE — PROGRESS NOTES
RN MOBILITY NOTE     Surgery patient?: no  Date of surgery: n/a  Ambulated 50 ft on day of surgery? (N/A if today is not date of surgery): n/a  Number of times ambulated 50 feet or greater today: 2  Patient has been up to chair, edge of bed or HOB 90 degrees for all meals?: yes  Goal met? (goal is ambulating at least 50 feet 2 times on day shift, one time on night shift): no  If patient did not meet mobility goal, why?: pt ambulated once during the day and once at night.

## 2019-02-03 NOTE — PROGRESS NOTES
"1240: In patient's room assessing pain. Pt started crying, screaming, hyperventilating about pain in head. Pt states nothing helps and he doesn't want to be here anymore. Pt states he wishes he was blind, wishes he could jump out the window. This RN asked pt if he wanted to harm himself and he stated \"yes.\" Pt also states he feels like he has been hearing voices and that someone told him to call a number and he did. This RN provided emotional support and states that he is safe here, but pt states \"I'm not sure anymore\" and that \"my head might explode.\" This RN called SA[facility] Aiken and legal hold implemented. Charge RN aware as well. Will continue to monitor.   "

## 2019-02-03 NOTE — PROGRESS NOTES
1550 Patient's in bed, brother visiting. No distress noted.    1808 Patient's in bed, medicated with Oxycodone (see MAR) for c/o's headache, rates pain 8/10.

## 2019-02-03 NOTE — PROGRESS NOTES
Neurosurgery Progress Note    Subjective:  Headache & photophobia persistent  No other changes overnight    Exam:  Sleeping  Oriented- tongue ML  Follows commands  NO drift  PERRL 2mm    BP  Min: 87/57  Max: 117/56  Pulse  Av.2  Min: 53  Max: 62  Resp  Av  Min: 16  Max: 20  Temp  Av °C (98.6 °F)  Min: 36.7 °C (98 °F)  Max: 37.4 °C (99.3 °F)  SpO2  Av.4 %  Min: 93 %  Max: 98 %    No Data Recorded                      Intake/Output       19 07 - 19 0659 19 07 - 19 0659      1900-0659 Total  Total       Intake    P.O.  --  240 240  --  -- --    P.O. -- 240 240 -- -- --    Total Intake -- 240 240 -- -- --       Output    Stool  --  -- --  --  -- --    Number of Times Stooled 4 x -- 4 x -- -- --    Total Output -- -- -- -- -- --       Net I/O     -- 240 240 -- -- --            Intake/Output Summary (Last 24 hours) at 19 0829  Last data filed at 19 0600   Gross per 24 hour   Intake              240 ml   Output                0 ml   Net              240 ml            • oxyCODONE immediate-release  5-10 mg Q3HRS PRN   • acetaminophen/caffeine/butalbital 325-40-50 mg  2 Tab Q6HRS PRN   • levETIRAcetam  500 mg BID   • Respiratory Care per Protocol   Continuous RT   • Pharmacy Consult Request  1 Each PHARMACY TO DOSE   • docusate sodium  100 mg BID   • senna-docusate  1 Tab Q24HRS PRN   • polyethylene glycol/lytes  1 Packet BID   • magnesium hydroxide  30 mL DAILY   • bisacodyl  10 mg Q24HRS PRN   • fleet  1 Each Once PRN   • ondansetron  4 mg Q4HRS PRN       Assessment and Plan:  Hospital day #5  POD #NA  Prophylactic anticoagulation: no         Start date/time: tbd  Stable bifrontal contusions on repeat scan   Neuro stable  mobilize as tolerated  No neurosurgical intervention at this time

## 2019-02-03 NOTE — PROGRESS NOTES
"Spoke with Trauma NP in regard to pt symptoms and increased pain. Pt stating he has increased pressure behind his eyes and reports visual changes that he is unable to describe as anything other than \"not being able to focus his eyes\". Re-evaluated pts pain medication regimen. New orders placed to better manage pts pain. Monitoring for improvement.  "

## 2019-02-03 NOTE — PROGRESS NOTES
Assumed care of pt. Bedside report completed with Lisette PINO. Pt resting in bed. Pt slightly agitated because pupils were checked but physician, but otherwise pt laying flat in bed. Pt refuses HOB elevated. Pt educated on benefit. Bed low and locked. Call light within reach. Offered needs.

## 2019-02-03 NOTE — PROGRESS NOTES
"Pt A/Ox4. Pt reports severe photophobia, however pt got up by himself, turned his bed alarm off, and was found in the shower by the CNA because he \"wanted to shower\". Pt has been educated on fall risk, and was compliant and verbalized understanding. Pt was agitated that he had asked for a shower earlier in the day and was unable to do so and decided to do it himself. Pt however had no problems showering with the light on and had no complaints. However, upon assessment, pt very sensitive to the light does not tolerate any amount of light very well. Symptoms reported by pt are inconsistent. Pt reports a headache at this time. States his pain management regimen is not effective and is requesting Fentanyl. Spoke with trauma NP, no new orders placed at this time. Call light within reach, bed alarm on, pt educated on fall risk.  "

## 2019-02-03 NOTE — PROGRESS NOTES
Trauma / Surgical Daily Progress Note    Date of Service  2/3/2019    Chief Complaint  28 y.o. male admitted 1/30/2019 with SAH (subarachnoid hemorrhage) (HCC)  Fall    Interval Events  Pt consented to have trauma service speak with brother about pain medication issues.   Will call today   Speech to see for safe discharge plan.   Lives in [place] with roommates, at this time does not know how he getting home   Disposition home after speech clears pt for discharge or Skilled referral.       Review of Systems  Review of Systems   Constitutional: Positive for malaise/fatigue.   HENT: Negative.    Eyes: Positive for photophobia.   Respiratory: Negative.    Gastrointestinal: Negative for constipation.        2/2 +BM   Genitourinary:        Voiding   Musculoskeletal: Negative.    Skin: Negative.    All other systems reviewed and are negative.       Vital Signs  Temp:  [36.7 °C (98 °F)-37.4 °C (99.3 °F)] 37.4 °C (99.3 °F)  Pulse:  [53-62] 56  Resp:  [16-20] 20  BP: ()/(52-65) 106/60  SpO2:  [93 %-98 %] 95 %    Physical Exam  Physical Exam   Constitutional: He appears well-developed and well-nourished.   Refuses to speak or open eyes secondary to pain   Cooperated for short times.   Neck: Normal range of motion.   Cardiovascular: Normal rate.    Pulmonary/Chest: Breath sounds normal. No respiratory distress.   Abdominal: Soft. There is no tenderness.   Musculoskeletal: Normal range of motion.   Neurological: He is alert.   Skin: Skin is warm and dry.   Psychiatric:   Disengaged    Nursing note and vitals reviewed.      Laboratory  No results found for this or any previous visit (from the past 24 hour(s)).    Fluids    Intake/Output Summary (Last 24 hours) at 02/03/19 0800  Last data filed at 02/03/19 0600   Gross per 24 hour   Intake              240 ml   Output                0 ml   Net              240 ml       Core Measures & Quality Metrics  Medications reviewed  Shi catheter: No Shi      DVT Prophylaxis:  Contraindicated - High bleeding risk    Ulcer prophylaxis: Not indicated    Assessed for rehab: Patient returned to prior level of function, rehabilitation not indicated at this time    Total Score: 3    ETOH Screening  CAGE Score: 0  Intervention complete date: 1/31/2019  Patient response to intervention: I drink twice per week, socially .   Patient demonstrats understanding of intervention.Plan of care:    has not been contacted.Follow up with: PCP  Total ETOH intervention time: 15 - 30 mintues      Assessment/Plan  ICB (intracranial bleed) (HCC)- (present on admission)   Assessment & Plan    Referring facility CT head shows right frontal intraparenchymal 2cm hemorrhage with a smaller right frontal convexity 11mm.   Frontal falx subarachnoid hemorrhage extending to the left 5mm with a right to left shift  Small frontal floor extra-axial blood noted.   Repeat CT imaging with no significant interval change.  Keppra x 7 days (2/5/19)  Non-operative management.   Russell Lara MD. Neurosurgery     Skull fracture (HCC)- (present on admission)   Assessment & Plan    Referring facility CT head shows post-occipital skull fracture.  Non-operative management.  Russell Lara MD. Neurosurgery     Contraindication to deep vein thrombosis (DVT) prophylaxis- (present on admission)   Assessment & Plan    Systemic anticoagulation contraindicated secondary to elevated bleeding risk.  1/31 Trauma screening bilateral lower extremity venous duplex negative for above knee DVT     Trauma- (present on admission)   Assessment & Plan    Fall on ice, striking back of head. (+)loss of consciousness  Trauma Green Transfer Activation.  Kristin Salvador MD, Trauma/General Surgery.         Discussed patient condition with RN, Patient and trauma surgery. Dr. Salvador

## 2019-02-04 PROBLEM — F99 PSYCHIATRIC COMPLAINT: Status: ACTIVE | Noted: 2019-02-04

## 2019-02-04 LAB
ANION GAP SERPL CALC-SCNC: 8 MMOL/L (ref 0–11.9)
BASOPHILS # BLD AUTO: 0.5 % (ref 0–1.8)
BASOPHILS # BLD: 0.03 K/UL (ref 0–0.12)
BUN SERPL-MCNC: 13 MG/DL (ref 8–22)
CALCIUM SERPL-MCNC: 8.9 MG/DL (ref 8.5–10.5)
CHLORIDE SERPL-SCNC: 101 MMOL/L (ref 96–112)
CO2 SERPL-SCNC: 26 MMOL/L (ref 20–33)
CREAT SERPL-MCNC: 0.96 MG/DL (ref 0.5–1.4)
EOSINOPHIL # BLD AUTO: 0.15 K/UL (ref 0–0.51)
EOSINOPHIL NFR BLD: 2.6 % (ref 0–6.9)
ERYTHROCYTE [DISTWIDTH] IN BLOOD BY AUTOMATED COUNT: 37.2 FL (ref 35.9–50)
GLUCOSE SERPL-MCNC: 185 MG/DL (ref 65–99)
HCT VFR BLD AUTO: 45.5 % (ref 42–52)
HGB BLD-MCNC: 15 G/DL (ref 14–18)
IMM GRANULOCYTES # BLD AUTO: 0.02 K/UL (ref 0–0.11)
IMM GRANULOCYTES NFR BLD AUTO: 0.3 % (ref 0–0.9)
LYMPHOCYTES # BLD AUTO: 1.23 K/UL (ref 1–4.8)
LYMPHOCYTES NFR BLD: 21.2 % (ref 22–41)
MCH RBC QN AUTO: 28.1 PG (ref 27–33)
MCHC RBC AUTO-ENTMCNC: 33 G/DL (ref 33.7–35.3)
MCV RBC AUTO: 85.2 FL (ref 81.4–97.8)
MONOCYTES # BLD AUTO: 0.37 K/UL (ref 0–0.85)
MONOCYTES NFR BLD AUTO: 6.4 % (ref 0–13.4)
NEUTROPHILS # BLD AUTO: 3.99 K/UL (ref 1.82–7.42)
NEUTROPHILS NFR BLD: 69 % (ref 44–72)
NRBC # BLD AUTO: 0 K/UL
NRBC BLD-RTO: 0 /100 WBC
PLATELET # BLD AUTO: 248 K/UL (ref 164–446)
PMV BLD AUTO: 10.7 FL (ref 9–12.9)
POTASSIUM SERPL-SCNC: 3.5 MMOL/L (ref 3.6–5.5)
RBC # BLD AUTO: 5.34 M/UL (ref 4.7–6.1)
SODIUM SERPL-SCNC: 135 MMOL/L (ref 135–145)
WBC # BLD AUTO: 5.8 K/UL (ref 4.8–10.8)

## 2019-02-04 PROCEDURE — 700102 HCHG RX REV CODE 250 W/ 637 OVERRIDE(OP): Performed by: NURSE PRACTITIONER

## 2019-02-04 PROCEDURE — A9270 NON-COVERED ITEM OR SERVICE: HCPCS | Performed by: SURGERY

## 2019-02-04 PROCEDURE — 700102 HCHG RX REV CODE 250 W/ 637 OVERRIDE(OP): Performed by: STUDENT IN AN ORGANIZED HEALTH CARE EDUCATION/TRAINING PROGRAM

## 2019-02-04 PROCEDURE — 80048 BASIC METABOLIC PNL TOTAL CA: CPT

## 2019-02-04 PROCEDURE — 770001 HCHG ROOM/CARE - MED/SURG/GYN PRIV*

## 2019-02-04 PROCEDURE — A9270 NON-COVERED ITEM OR SERVICE: HCPCS | Performed by: NURSE PRACTITIONER

## 2019-02-04 PROCEDURE — A9270 NON-COVERED ITEM OR SERVICE: HCPCS | Performed by: STUDENT IN AN ORGANIZED HEALTH CARE EDUCATION/TRAINING PROGRAM

## 2019-02-04 PROCEDURE — 85025 COMPLETE CBC W/AUTO DIFF WBC: CPT

## 2019-02-04 PROCEDURE — 700111 HCHG RX REV CODE 636 W/ 250 OVERRIDE (IP): Performed by: SURGERY

## 2019-02-04 PROCEDURE — 99254 IP/OBS CNSLTJ NEW/EST MOD 60: CPT | Performed by: PSYCHIATRY & NEUROLOGY

## 2019-02-04 PROCEDURE — 700102 HCHG RX REV CODE 250 W/ 637 OVERRIDE(OP): Performed by: SURGERY

## 2019-02-04 PROCEDURE — 36415 COLL VENOUS BLD VENIPUNCTURE: CPT

## 2019-02-04 RX ORDER — MIRTAZAPINE 15 MG/1
7.5 TABLET, FILM COATED ORAL
Status: DISCONTINUED | OUTPATIENT
Start: 2019-02-04 | End: 2019-02-08 | Stop reason: HOSPADM

## 2019-02-04 RX ORDER — SCOLOPAMINE TRANSDERMAL SYSTEM 1 MG/1
1 PATCH, EXTENDED RELEASE TRANSDERMAL
Status: DISCONTINUED | OUTPATIENT
Start: 2019-02-04 | End: 2019-02-08 | Stop reason: HOSPADM

## 2019-02-04 RX ADMIN — ONDANSETRON 4 MG: 2 INJECTION INTRAMUSCULAR; INTRAVENOUS at 03:31

## 2019-02-04 RX ADMIN — OXYCODONE HYDROCHLORIDE 10 MG: 5 TABLET ORAL at 20:53

## 2019-02-04 RX ADMIN — LEVETIRACETAM 500 MG: 500 TABLET, FILM COATED ORAL at 04:29

## 2019-02-04 RX ADMIN — BUTALBITAL, ACETAMINOPHEN, AND CAFFEINE 2 TABLET: 50; 325; 40 TABLET ORAL at 00:59

## 2019-02-04 RX ADMIN — BUTALBITAL, ACETAMINOPHEN, AND CAFFEINE 2 TABLET: 50; 325; 40 TABLET ORAL at 06:45

## 2019-02-04 RX ADMIN — LEVETIRACETAM 500 MG: 500 TABLET, FILM COATED ORAL at 19:02

## 2019-02-04 RX ADMIN — SCOPALAMINE 1 PATCH: 1 PATCH, EXTENDED RELEASE TRANSDERMAL at 10:08

## 2019-02-04 RX ADMIN — OXYCODONE HYDROCHLORIDE 10 MG: 5 TABLET ORAL at 00:59

## 2019-02-04 RX ADMIN — ONDANSETRON 4 MG: 2 INJECTION INTRAMUSCULAR; INTRAVENOUS at 20:46

## 2019-02-04 RX ADMIN — BUTALBITAL, ACETAMINOPHEN, AND CAFFEINE 2 TABLET: 50; 325; 40 TABLET ORAL at 20:49

## 2019-02-04 RX ADMIN — BUTALBITAL, ACETAMINOPHEN, AND CAFFEINE 2 TABLET: 50; 325; 40 TABLET ORAL at 13:35

## 2019-02-04 RX ADMIN — MIRTAZAPINE 7.5 MG: 15 TABLET, FILM COATED ORAL at 20:49

## 2019-02-04 ASSESSMENT — ENCOUNTER SYMPTOMS
PHOTOPHOBIA: 1
RESPIRATORY NEGATIVE: 1
MUSCULOSKELETAL NEGATIVE: 1
CONSTIPATION: 0

## 2019-02-04 NOTE — CARE PLAN
"Problem: Safety  Goal: Will remain free from injury  Outcome: PROGRESSING AS EXPECTED  Pt has remained free from injury this visit. Pt calls for help appropriately. Fall precautions in place, pt educated on fall risk. 1:1 sitter at bedside, dietary notified of no sharp items.    Problem: Psychosocial Needs:  Goal: Level of anxiety will decrease  Outcome: PROGRESSING SLOWER THAN EXPECTED  Pt agitated. Pt educated on condition and the healing process. Therapeutic discussion used. Pt apologetic, stating his pain is \"just unbearable\". Treatment team aware. Will monitor and update team for any changes in condition.      "

## 2019-02-04 NOTE — PROGRESS NOTES
"Pt provided eye patches to provide comfort to guard out the light from his eyes. Pt pleasant this morning. Despite c/o 10/10 pain, pt appears to be able to tolerate the light better than before and is able to carry on in conversation. During previous shifts, pt did not converse d/t his intense headaches and stated jaw pain which were worsened w/ talking. Pt walked around the unit with the the CNA and tolerated it well. Pt encouraged to increase mobility and to get out of his room to aid in psychological improvement as well as overall comfort. Pt states he has not walked much since he has been here and stated the walk helped with his headache and made him feel better. Pt noted that he no longer wants to take oxycodone, he states \"it makes me see things when my eyes are closed and it sounds like people are walking around in my room when I know they aren't\". Educated pt that those may also be side affects from being in constant darkness and constant silence for several days. Getting up and out of his room reinforced to aide in these side affects.  "
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
## 2019-02-04 NOTE — PROGRESS NOTES
Trauma / Surgical Daily Progress Note    Date of Service  2/4/2019    Chief Complaint  28 y.o. male admitted 1/30/2019 with SAH (subarachnoid hemorrhage) (HCC)    Interval Events  Pt resistant to Intervention, will notify  for outside referrals.   Pt now on legal hold due to threatening to jump out window.  Sitter at bedside.    Disposition: medically cleared for discharge from trauma.  Med-deana pending.    Review of Systems  Review of Systems   Constitutional: Positive for malaise/fatigue.   HENT: Negative.    Eyes: Positive for photophobia.   Respiratory: Negative.    Gastrointestinal: Negative for constipation.        2/2 +BM   Genitourinary:        Voiding   Musculoskeletal: Negative.    Skin: Negative.    All other systems reviewed and are negative.       Vital Signs  Temp:  [36.2 °C (97.2 °F)-37.1 °C (98.7 °F)] 36.6 °C (97.8 °F)  Pulse:  [50-71] 50  Resp:  [16-19] 16  BP: (109-115)/(48-59) 112/48  SpO2:  [96 %-100 %] 98 %    Physical Exam  Physical Exam    Laboratory  Recent Results (from the past 24 hour(s))   BASIC METABOLIC PANEL    Collection Time: 02/04/19  9:28 AM   Result Value Ref Range    Sodium 135 135 - 145 mmol/L    Potassium 3.5 (L) 3.6 - 5.5 mmol/L    Chloride 101 96 - 112 mmol/L    Co2 26 20 - 33 mmol/L    Glucose 185 (H) 65 - 99 mg/dL    Bun 13 8 - 22 mg/dL    Creatinine 0.96 0.50 - 1.40 mg/dL    Calcium 8.9 8.5 - 10.5 mg/dL    Anion Gap 8.0 0.0 - 11.9   CBC WITH DIFFERENTIAL    Collection Time: 02/04/19  9:28 AM   Result Value Ref Range    WBC 5.8 4.8 - 10.8 K/uL    RBC 5.34 4.70 - 6.10 M/uL    Hemoglobin 15.0 14.0 - 18.0 g/dL    Hematocrit 45.5 42.0 - 52.0 %    MCV 85.2 81.4 - 97.8 fL    MCH 28.1 27.0 - 33.0 pg    MCHC 33.0 (L) 33.7 - 35.3 g/dL    RDW 37.2 35.9 - 50.0 fL    Platelet Count 248 164 - 446 K/uL    MPV 10.7 9.0 - 12.9 fL    Neutrophils-Polys 69.00 44.00 - 72.00 %    Lymphocytes 21.20 (L) 22.00 - 41.00 %    Monocytes 6.40 0.00 - 13.40 %    Eosinophils 2.60 0.00 - 6.90  %    Basophils 0.50 0.00 - 1.80 %    Immature Granulocytes 0.30 0.00 - 0.90 %    Nucleated RBC 0.00 /100 WBC    Neutrophils (Absolute) 3.99 1.82 - 7.42 K/uL    Lymphs (Absolute) 1.23 1.00 - 4.80 K/uL    Monos (Absolute) 0.37 0.00 - 0.85 K/uL    Eos (Absolute) 0.15 0.00 - 0.51 K/uL    Baso (Absolute) 0.03 0.00 - 0.12 K/uL    Immature Granulocytes (abs) 0.02 0.00 - 0.11 K/uL    NRBC (Absolute) 0.00 K/uL   ESTIMATED GFR    Collection Time: 02/04/19  9:28 AM   Result Value Ref Range    GFR If African American >60 >60 mL/min/1.73 m 2    GFR If Non African American >60 >60 mL/min/1.73 m 2       Fluids    Intake/Output Summary (Last 24 hours) at 02/04/19 1021  Last data filed at 02/04/19 0600   Gross per 24 hour   Intake              480 ml   Output                0 ml   Net              480 ml       Core Measures & Quality Metrics  Core Measures & Quality Metrics  ORLIN Score  ETOH Screening    Assessment/Plan  Psychiatric complaint   Assessment & Plan    Verbalized wanting to kill self to nurses  Legal hold placed  Pscy consult called and order placed     ICB (intracranial bleed) (HCC)- (present on admission)   Assessment & Plan    Referring facility CT head shows right frontal intraparenchymal 2cm hemorrhage with a smaller right frontal convexity 11mm.   Frontal falx subarachnoid hemorrhage extending to the left 5mm with a right to left shift  Small frontal floor extra-axial blood noted.   Repeat CT imaging with no significant interval change.  Keppra x 7 days (2/5/19)  Non-operative management.   Russell Lara MD. Neurosurgery     Skull fracture (HCC)- (present on admission)   Assessment & Plan    Referring facility CT head shows post-occipital skull fracture.  Non-operative management.  Russell Lara MD. Neurosurgery     Contraindication to deep vein thrombosis (DVT) prophylaxis- (present on admission)   Assessment & Plan    Systemic anticoagulation contraindicated secondary to elevated bleeding risk.  1/31 Trauma  screening bilateral lower extremity venous duplex negative for above knee DVT     Trauma- (present on admission)   Assessment & Plan    Fall on ice, striking back of head. (+)loss of consciousness  Trauma Green Transfer Activation.  Kristin Salvador MD, Trauma/General Surgery.         Discussed patient condition with RN, Patient and trauma surgery. Dr. Salvador

## 2019-02-04 NOTE — DISCHARGE PLANNING
Anticipated Discharge Disposition:   inpt psych facility.    Action:   Consuelo from [facility] confirmed that pt is pending for Med-[facility]    CN informed CM that pt is no legal hold.   Waiting for psych to eval    PT/OT has no AVS recommendation.     Barriers to Discharge:   Trauma clearance  Pt is on legal hold.     Plan:   Follow up with APRN.

## 2019-02-04 NOTE — PROGRESS NOTES
Assumed care of pt. Sitter at bedside. Pt. Resting in bed complains of photophobia. Pain 8/10 declines pain medication at this time. Denies numbness and tinglingling. Bed alarm on. Call light within reach.

## 2019-02-04 NOTE — PROGRESS NOTES
RN MOBILITY NOTE     Surgery patient?: no  Date of surgery: n/a  Ambulated 50 ft on day of surgery? (N/A if today is not date of surgery): n/a  Number of times ambulated 50 feet or greater today: 2  Patient has been up to chair, edge of bed or HOB 90 degrees for all meals?: yes  Goal met? (goal is ambulating at least 50 feet 2 times on day shift, one time on night shift): no  If patient did not meet mobility goal, why?: pt refusing

## 2019-02-04 NOTE — ASSESSMENT & PLAN NOTE
2/4 Verbalized wanting to kill self to nurses  Legal hold placed  Pscyh consult called and order placed  - Start mirtazapine 7.5 mg qHS for mood/insomnia per psychiatry team   2/7 Legal hold discontinued per psychiatry

## 2019-02-04 NOTE — PROGRESS NOTES
Neurosurgery Progress Note    Subjective:  Headache & photophobia persistent but slowly improving  Some nausea  No other changes overnight    Exam:  A&Ox4  Follows commands  NO drift  PERRL 2mm, EOM intact  Strength & sensation grossly intact bilat UE & LE    BP  Min: 109/59  Max: 115/59  Pulse  Av.3  Min: 50  Max: 71  Resp  Av.8  Min: 16  Max: 19  Temp  Av.7 °C (98.1 °F)  Min: 36.2 °C (97.2 °F)  Max: 37.1 °C (98.7 °F)  SpO2  Av.8 %  Min: 96 %  Max: 100 %    No Data Recorded                      Intake/Output       19 - 19 0659 19 - 19 0659       Total  Total       Intake    P.O.  --  480 480  --  -- --    P.O. -- 480 480 -- -- --    Total Intake -- 480 480 -- -- --       Output    Urine  --  -- --  --  -- --    Number of Times Voided 2 x -- 2 x -- -- --    Total Output -- -- -- -- -- --       Net I/O     -- 480 480 -- -- --            Intake/Output Summary (Last 24 hours) at 19 0913  Last data filed at 19 0600   Gross per 24 hour   Intake              480 ml   Output                0 ml   Net              480 ml            • scopolamine  1 Patch Q72HRS   • oxyCODONE immediate-release  5-10 mg Q3HRS PRN   • acetaminophen/caffeine/butalbital 325-40-50 mg  2 Tab Q6HRS PRN   • levETIRAcetam  500 mg BID   • Respiratory Care per Protocol   Continuous RT   • Pharmacy Consult Request  1 Each PHARMACY TO DOSE   • docusate sodium  100 mg BID   • senna-docusate  1 Tab Q24HRS PRN   • polyethylene glycol/lytes  1 Packet BID   • magnesium hydroxide  30 mL DAILY   • bisacodyl  10 mg Q24HRS PRN   • fleet  1 Each Once PRN   • ondansetron  4 mg Q4HRS PRN       Assessment and Plan:  Hospital day #6  POD #NA  Prophylactic anticoagulation: no         Start date/time: tbd  Stable bifrontal contusions on repeat scan   Neuro stable  Continue pain & nausea control  mobilize as tolerated  No neurosurgical intervention at this time.

## 2019-02-04 NOTE — PROGRESS NOTES
1:1 sitter at bedside. Pt A/Ox4. Pt restless, c/o pressure behind his eyes. Pt medicated per day rn during change of shift, ice pack provided. Pt denies numbness/tingling/vision changes. Call light within reach. No further complaints at this time. Monitoring pt closely.

## 2019-02-05 PROBLEM — R45.89 SUICIDE RISK: Status: ACTIVE | Noted: 2019-02-05

## 2019-02-05 PROCEDURE — 770001 HCHG ROOM/CARE - MED/SURG/GYN PRIV*

## 2019-02-05 PROCEDURE — 700102 HCHG RX REV CODE 250 W/ 637 OVERRIDE(OP): Performed by: STUDENT IN AN ORGANIZED HEALTH CARE EDUCATION/TRAINING PROGRAM

## 2019-02-05 PROCEDURE — A9270 NON-COVERED ITEM OR SERVICE: HCPCS | Performed by: NURSE PRACTITIONER

## 2019-02-05 PROCEDURE — 700111 HCHG RX REV CODE 636 W/ 250 OVERRIDE (IP): Performed by: SURGERY

## 2019-02-05 PROCEDURE — A9270 NON-COVERED ITEM OR SERVICE: HCPCS | Performed by: SURGERY

## 2019-02-05 PROCEDURE — 700102 HCHG RX REV CODE 250 W/ 637 OVERRIDE(OP): Performed by: SURGERY

## 2019-02-05 PROCEDURE — 700102 HCHG RX REV CODE 250 W/ 637 OVERRIDE(OP): Performed by: NURSE PRACTITIONER

## 2019-02-05 PROCEDURE — A9270 NON-COVERED ITEM OR SERVICE: HCPCS | Performed by: STUDENT IN AN ORGANIZED HEALTH CARE EDUCATION/TRAINING PROGRAM

## 2019-02-05 RX ADMIN — BUTALBITAL, ACETAMINOPHEN, AND CAFFEINE 2 TABLET: 50; 325; 40 TABLET ORAL at 02:53

## 2019-02-05 RX ADMIN — BUTALBITAL, ACETAMINOPHEN, AND CAFFEINE 2 TABLET: 50; 325; 40 TABLET ORAL at 15:47

## 2019-02-05 RX ADMIN — BUTALBITAL, ACETAMINOPHEN, AND CAFFEINE 2 TABLET: 50; 325; 40 TABLET ORAL at 21:53

## 2019-02-05 RX ADMIN — MIRTAZAPINE 7.5 MG: 15 TABLET, FILM COATED ORAL at 21:53

## 2019-02-05 RX ADMIN — OXYCODONE HYDROCHLORIDE 10 MG: 5 TABLET ORAL at 02:20

## 2019-02-05 RX ADMIN — ONDANSETRON 4 MG: 2 INJECTION INTRAMUSCULAR; INTRAVENOUS at 15:47

## 2019-02-05 RX ADMIN — LEVETIRACETAM 500 MG: 500 TABLET, FILM COATED ORAL at 17:01

## 2019-02-05 RX ADMIN — BUTALBITAL, ACETAMINOPHEN, AND CAFFEINE 2 TABLET: 50; 325; 40 TABLET ORAL at 09:01

## 2019-02-05 RX ADMIN — LEVETIRACETAM 500 MG: 500 TABLET, FILM COATED ORAL at 05:14

## 2019-02-05 ASSESSMENT — ENCOUNTER SYMPTOMS
MUSCULOSKELETAL NEGATIVE: 1
CONSTIPATION: 0
PHOTOPHOBIA: 1
RESPIRATORY NEGATIVE: 1

## 2019-02-05 NOTE — PROGRESS NOTES
Pt belongings bagged up. Belongings put in closet. Sitter notified not to let pt. Have belongings

## 2019-02-05 NOTE — PROGRESS NOTES
Assumed care of pt. Pt A&Ox4. Denies numbness or tingling. Pain 6/10, medicated per MAR.Call light within reach. Sitter at bedside. Bed alarm on. Breakfast set up and pt. Sitting up in bed

## 2019-02-05 NOTE — PROGRESS NOTES
Trauma / Surgical Daily Progress Note    Date of Service  2/5/2019    Chief Complaint  28 y.o. male admitted 1/30/2019 with SAH (subarachnoid hemorrhage) (HCC)    Interval Events  Legal hold extended.  Continue therapies  Medically cleared for post acute services.       Review of Systems  Review of Systems   Constitutional: Positive for malaise/fatigue.   HENT: Negative.    Eyes: Positive for photophobia.   Respiratory: Negative.    Gastrointestinal: Negative for constipation.        2/2 +BM   Genitourinary:        Voiding   Musculoskeletal: Negative.    Skin: Negative.    All other systems reviewed and are negative.       Vital Signs  Temp:  [36.4 °C (97.5 °F)-37.3 °C (99.2 °F)] 36.4 °C (97.5 °F)  Pulse:  [45-61] 45  Resp:  [16] 16  BP: ()/(45-60) 97/51  SpO2:  [94 %-98 %] 97 %    Physical Exam  Physical Exam   Constitutional: He appears well-developed and well-nourished.   Refuses to speak or open eyes secondary to pain   Cooperated for short times.   Neck: Normal range of motion.   Cardiovascular: Normal rate.    Pulmonary/Chest: Breath sounds normal. No respiratory distress.   Abdominal: Soft. There is no tenderness.   Musculoskeletal: Normal range of motion.   Neurological: He is alert.   Skin: Skin is warm and dry.   Psychiatric:   Disengaged   Legal hold   Nursing note and vitals reviewed.      Laboratory  Recent Results (from the past 24 hour(s))   BASIC METABOLIC PANEL    Collection Time: 02/04/19  9:28 AM   Result Value Ref Range    Sodium 135 135 - 145 mmol/L    Potassium 3.5 (L) 3.6 - 5.5 mmol/L    Chloride 101 96 - 112 mmol/L    Co2 26 20 - 33 mmol/L    Glucose 185 (H) 65 - 99 mg/dL    Bun 13 8 - 22 mg/dL    Creatinine 0.96 0.50 - 1.40 mg/dL    Calcium 8.9 8.5 - 10.5 mg/dL    Anion Gap 8.0 0.0 - 11.9   CBC WITH DIFFERENTIAL    Collection Time: 02/04/19  9:28 AM   Result Value Ref Range    WBC 5.8 4.8 - 10.8 K/uL    RBC 5.34 4.70 - 6.10 M/uL    Hemoglobin 15.0 14.0 - 18.0 g/dL    Hematocrit 45.5  42.0 - 52.0 %    MCV 85.2 81.4 - 97.8 fL    MCH 28.1 27.0 - 33.0 pg    MCHC 33.0 (L) 33.7 - 35.3 g/dL    RDW 37.2 35.9 - 50.0 fL    Platelet Count 248 164 - 446 K/uL    MPV 10.7 9.0 - 12.9 fL    Neutrophils-Polys 69.00 44.00 - 72.00 %    Lymphocytes 21.20 (L) 22.00 - 41.00 %    Monocytes 6.40 0.00 - 13.40 %    Eosinophils 2.60 0.00 - 6.90 %    Basophils 0.50 0.00 - 1.80 %    Immature Granulocytes 0.30 0.00 - 0.90 %    Nucleated RBC 0.00 /100 WBC    Neutrophils (Absolute) 3.99 1.82 - 7.42 K/uL    Lymphs (Absolute) 1.23 1.00 - 4.80 K/uL    Monos (Absolute) 0.37 0.00 - 0.85 K/uL    Eos (Absolute) 0.15 0.00 - 0.51 K/uL    Baso (Absolute) 0.03 0.00 - 0.12 K/uL    Immature Granulocytes (abs) 0.02 0.00 - 0.11 K/uL    NRBC (Absolute) 0.00 K/uL   ESTIMATED GFR    Collection Time: 02/04/19  9:28 AM   Result Value Ref Range    GFR If African American >60 >60 mL/min/1.73 m 2    GFR If Non African American >60 >60 mL/min/1.73 m 2       Fluids    Intake/Output Summary (Last 24 hours) at 02/05/19 0854  Last data filed at 02/05/19 0600   Gross per 24 hour   Intake              480 ml   Output                0 ml   Net              480 ml       Core Measures & Quality Metrics  Medications reviewed  Shi catheter: No Shi      DVT Prophylaxis: Contraindicated - High bleeding risk    Ulcer prophylaxis: Not indicated    Assessed for rehab: Patient returned to prior level of function, rehabilitation not indicated at this time    Total Score: 3    ETOH Screening  CAGE Score: 0  Intervention complete date: 1/31/2019  Patient response to intervention: I drink twice per week, socially .   Patient demonstrats understanding of intervention.Plan of care:    has not been contacted.Follow up with: PCP  Total ETOH intervention time: 15 - 30 mintues      Assessment/Plan  Psychiatric complaint   Assessment & Plan    Verbalized wanting to kill self to nurses  Legal hold placed  Pscy consult called and order placed     ICB  (intracranial bleed) (HCC)- (present on admission)   Assessment & Plan    Referring facility CT head shows right frontal intraparenchymal 2cm hemorrhage with a smaller right frontal convexity 11mm.   Frontal falx subarachnoid hemorrhage extending to the left 5mm with a right to left shift  Small frontal floor extra-axial blood noted.   Repeat CT imaging with no significant interval change.  Keppra x 7 days (2/5/19)  Non-operative management.   Russell Lara MD. Neurosurgery     Skull fracture (HCC)- (present on admission)   Assessment & Plan    Referring facility CT head shows post-occipital skull fracture.  Non-operative management.  Russell Lara MD. Neurosurgery     Contraindication to deep vein thrombosis (DVT) prophylaxis- (present on admission)   Assessment & Plan    Systemic anticoagulation contraindicated secondary to elevated bleeding risk.  1/31 Trauma screening bilateral lower extremity venous duplex negative for above knee DVT     Suicide risk   Assessment & Plan    2/4 psych consult  Legal hold extended.       Trauma- (present on admission)   Assessment & Plan    Fall on ice, striking back of head. (+)loss of consciousness  Trauma Green Transfer Activation.  Kristin Salvador MD, Trauma/General Surgery.         Discussed patient condition with RN, Patient and trauma surgery. Dr. Salvador

## 2019-02-05 NOTE — PROGRESS NOTES
Received phone call from patient's mother indicating that the family is worried that they have not been able to reach the patient. Explained that I can give no further information due to legal hold. Left page with Psych to get further instructions about legal hold

## 2019-02-05 NOTE — PROGRESS NOTES
RN MOBILITY NOTE     Surgery patient?: no  Date of surgery: n/a  Ambulated 50 ft on day of surgery? (N/A if today is not date of surgery): n/a  Number of times ambulated 50 feet or greater today: 0  Patient has been up to chair, edge of bed or HOB 90 degrees for all meals?: 3/3  Goal met? (goal is ambulating at least 50 feet 2 times on day shift, one time on night shift): no  If patient did not meet mobility goal, why?: pt refused

## 2019-02-05 NOTE — PROGRESS NOTES
"Neurosurgery Progress Note    Subjective:  Threatened self harm yesterday- psych consulted  C/o persistent headache  Per RN- neuro stable, poor cooperation to adjust to light    Exam:  A&Ox4, minimally cooperative for exam, lights dimmed  Answers some questions appropriately but states \"I am sleeping\"  MANCILLA    BP  Min: 97/51  Max: 107/60  Pulse  Av  Min: 45  Max: 61  Resp  Av  Min: 16  Max: 16  Temp  Av.8 °C (98.3 °F)  Min: 36.4 °C (97.5 °F)  Max: 37.3 °C (99.2 °F)  SpO2  Av.3 %  Min: 94 %  Max: 98 %    No Data Recorded    Recent Labs      19   0928   WBC  5.8   RBC  5.34   HEMOGLOBIN  15.0   HEMATOCRIT  45.5   MCV  85.2   MCH  28.1   MCHC  33.0*   RDW  37.2   PLATELETCT  248   MPV  10.7     Recent Labs      19   0928   SODIUM  135   POTASSIUM  3.5*   CHLORIDE  101   CO2  26   GLUCOSE  185*   BUN  13   CREATININE  0.96   CALCIUM  8.9               Intake/Output       19 0700 - 19 0659 19 0700 - 19 0659      [id number] [id number] Total [id number] 7854-1167 Total       Intake    P.O.  --  480 480  --  -- --    P.O. -- 480 480 -- -- --    Total Intake -- 480 480 -- -- --       Output    Total Output -- -- -- -- -- --       Net I/O     -- 480 480 -- -- --            Intake/Output Summary (Last 24 hours) at 19 0829  Last data filed at 19 0600   Gross per 24 hour   Intake              480 ml   Output                0 ml   Net              480 ml            • scopolamine  1 Patch Q72HRS   • mirtazapine  7.5 mg QHS   • oxyCODONE immediate-release  5-10 mg Q3HRS PRN   • acetaminophen/caffeine/butalbital 325-40-50 mg  2 Tab Q6HRS PRN   • levETIRAcetam  500 mg BID   • Respiratory Care per Protocol   Continuous RT   • Pharmacy Consult Request  1 Each PHARMACY TO DOSE   • docusate sodium  100 mg BID   • senna-docusate  1 Tab Q24HRS PRN   • polyethylene glycol/lytes  1 Packet BID   • magnesium hydroxide  30 mL DAILY   • bisacodyl  10 mg Q24HRS PRN   • fleet  1 Each " Once PRN   • ondansetron  4 mg Q4HRS PRN       Assessment and Plan:  Hospital day #7  POD #NA  Prophylactic anticoagulation: no         Start date/time: tbd  Stable bifrontal contusions on repeat scan 1/31  Neuro stable  Continue pain & nausea control  mobilize as tolerated  No neurosurgical intervention at this time  Appreciate recommendations per psych  OK to DC from [facility] POV once cleared by psych/medicine teams, follow up in our office in 2 weeks

## 2019-02-06 PROBLEM — R45.89 SUICIDE RISK: Status: RESOLVED | Noted: 2019-02-05 | Resolved: 2019-02-06

## 2019-02-06 PROCEDURE — A9270 NON-COVERED ITEM OR SERVICE: HCPCS | Performed by: STUDENT IN AN ORGANIZED HEALTH CARE EDUCATION/TRAINING PROGRAM

## 2019-02-06 PROCEDURE — 99232 SBSQ HOSP IP/OBS MODERATE 35: CPT | Performed by: PSYCHIATRY & NEUROLOGY

## 2019-02-06 PROCEDURE — 700102 HCHG RX REV CODE 250 W/ 637 OVERRIDE(OP): Performed by: NURSE PRACTITIONER

## 2019-02-06 PROCEDURE — 700102 HCHG RX REV CODE 250 W/ 637 OVERRIDE(OP): Performed by: STUDENT IN AN ORGANIZED HEALTH CARE EDUCATION/TRAINING PROGRAM

## 2019-02-06 PROCEDURE — 770001 HCHG ROOM/CARE - MED/SURG/GYN PRIV*

## 2019-02-06 PROCEDURE — 700102 HCHG RX REV CODE 250 W/ 637 OVERRIDE(OP): Performed by: SURGERY

## 2019-02-06 PROCEDURE — A9270 NON-COVERED ITEM OR SERVICE: HCPCS | Performed by: SURGERY

## 2019-02-06 PROCEDURE — A9270 NON-COVERED ITEM OR SERVICE: HCPCS | Performed by: NURSE PRACTITIONER

## 2019-02-06 RX ADMIN — LEVETIRACETAM 500 MG: 500 TABLET, FILM COATED ORAL at 05:13

## 2019-02-06 RX ADMIN — POLYETHYLENE GLYCOL 3350 1 PACKET: 17 POWDER, FOR SOLUTION ORAL at 17:58

## 2019-02-06 RX ADMIN — BUTALBITAL, ACETAMINOPHEN, AND CAFFEINE 2 TABLET: 50; 325; 40 TABLET ORAL at 23:48

## 2019-02-06 RX ADMIN — OXYCODONE HYDROCHLORIDE 10 MG: 5 TABLET ORAL at 21:11

## 2019-02-06 RX ADMIN — BUTALBITAL, ACETAMINOPHEN, AND CAFFEINE 2 TABLET: 50; 325; 40 TABLET ORAL at 17:58

## 2019-02-06 RX ADMIN — BUTALBITAL, ACETAMINOPHEN, AND CAFFEINE 2 TABLET: 50; 325; 40 TABLET ORAL at 05:13

## 2019-02-06 RX ADMIN — BUTALBITAL, ACETAMINOPHEN, AND CAFFEINE 2 TABLET: 50; 325; 40 TABLET ORAL at 11:59

## 2019-02-06 RX ADMIN — OXYCODONE HYDROCHLORIDE 10 MG: 5 TABLET ORAL at 17:58

## 2019-02-06 RX ADMIN — MIRTAZAPINE 7.5 MG: 15 TABLET, FILM COATED ORAL at 21:06

## 2019-02-06 ASSESSMENT — ENCOUNTER SYMPTOMS
PHOTOPHOBIA: 1
CONSTIPATION: 0
MYALGIAS: 0
NAUSEA: 0
HEADACHES: 1
SHORTNESS OF BREATH: 0
VOMITING: 0
ROS GI COMMENTS: 2/2 BM

## 2019-02-06 NOTE — DISCHARGE PLANNING
Anticipated Discharge Disposition:   Inpt psych    Action:   Legal hold papers need to be signed, APRN aware.  Placed infront of chart    Barriers to Discharge:   Legal hold    Plan:  Follow up with APRN  Follow up with legal hold [facility]  Fax to [facility] to start looking for inpt psych facility.     Addendum:  Talked to APRN and she has notified Dr. Salvador and have asked her to sign the legal hold documents.

## 2019-02-06 NOTE — PROGRESS NOTES
Bedside rounding complete. Patient a & o x 4 but sleeping. Patient on 1:1 legal hold d/t SI. Sitter at bedside.  Bed alarm on, upper side rails up, bed locked and in lowest position. Call bell and belongings within reach. Communication board updated.  Patient educated on hourly rounding.

## 2019-02-06 NOTE — PSYCHIATRY
"PSYCHIATRIC FOLLOW-UP:(established)  Reason for admission: TBI  Reason for consult: SI  Requesting Physician: Kristin Salvador M.D.            *HPI: Pt was found lying in his bed with sunglasses. Pt reported that he is feeling depressed for being in a hospital bed. He is looking forward to go home at [place], and stated he is planning to follow up with a PCP when discharged. He slept better this past two nights, about 5 hours per night. He has a fair appetite and energy, but has body aches in his neck, head and back. Pt denies any AVH, paranoia or delusions. He reported having dreams at night in which he feels difficult to differentiate from reality, but no dreams or out of reality feelings during the day. He denied any SI/HI. He again stated he fell and hit his head while intoxicated with alcohol. Since then, he has been very sensitive to lights, which are making his headache worse.         Medical ROS (as pertinent):    Headache, body pain (head, neck and back); sensitivity to light.                     *Psychiatric Examination:  Vitals:   Vitals:    02/06/19 0715   BP: (!) 99/48   Pulse: (!) 48   Resp: 17   Temp: 36.7 °C (98.1 °F)   SpO2: 95%       General Appearance:  man, lying down in bed wearing a sunglass, and a hat; fair grooming and hygiene  Behavior: calm and cooperative; does not appear to be internally preoccupied or to be responding to internal stimuli   Abnormal Movements: no psychomotor retardation or agiation  Gait and Posture: not tested  Speech: mis volume, tone and rhythm  Thought process: linear and goal oriented  Abnormal or Psychotic Thoughts denies any AVh, no paranoia or delusions elicited   Judgement and Insight: fair/fair  Orientation: grossly oriented  Recent and Remote Memory: fair  Attention Span and Concentration: intact  Mood and Affect: \"depressed; constricted  SI/HI:denies any SI/HI    Current Facility-Administered Medications   Medication Dose Route Frequency Provider " Last Rate Last Dose   • scopolamine (TRANSDERM-SCOP) patch 1 Patch  1 Patch Transdermal Q72HRS Kristin Salvador M.D.   1 Patch at 02/04/19 1008   • mirtazapine (REMERON) tablet 7.5 mg  7.5 mg Oral QHS Gil Fish M.D.   7.5 mg at 02/05/19 2153   • oxyCODONE immediate-release (ROXICODONE) tablet 5-10 mg  5-10 mg Oral Q3HRS PRN Meseret Mckeno A.P.R.NKareem   10 mg at 02/05/19 0220   • acetaminophen/caffeine/butalbital 325-40-50 mg (FIORICET) -40 MG per tablet 2 Tab  2 Tab Oral Q6HRS PRN Camilo Mitchell M.D.   2 Tab at 02/06/19 1159   • Respiratory Care per Protocol   Nebulization Continuous RT Kristin Salvador M.D.       • Pharmacy Consult Request ...Pain Management Review 1 Each  1 Each Other PHARMACY TO DOSE Kristin Salvador M.D.       • docusate sodium (COLACE) capsule 100 mg  100 mg Oral BID Kristin Salvador M.D.   100 mg at 02/02/19 0627   • senna-docusate (PERICOLACE or SENOKOT S) 8.6-50 MG per tablet 1 Tab  1 Tab Oral Q24HRS PRN Kristin Salvador M.D.       • polyethylene glycol/lytes (MIRALAX) PACKET 1 Packet  1 Packet Oral BID Kristin Salvador M.D.   1 Packet at 02/02/19 0629   • magnesium hydroxide (MILK OF MAGNESIA) suspension 30 mL  30 mL Oral DAILY Kristin Salvador M.D.   30 mL at 02/02/19 0629   • bisacodyl (DULCOLAX) suppository 10 mg  10 mg Rectal Q24HRS PRN Kristin Salvador M.D.       • fleet enema 133 mL  1 Each Rectal Once PRN Kristin Salvador M.D.       • ondansetron (ZOFRAN) syringe/vial injection 4 mg  4 mg Intravenous Q4HRS PRN Kristin Salvador M.D.   4 mg at 02/05/19 8545       Labs: No results found for this or any previous visit (from the past 48 hour(s)).      Assessment: Pt with hx of schizophrenia by hx, who was admitted for management of TBI after falling and hitting his head while intoxicated with alcohol. Pt today is endorsing depressed mood due to current stay in the hospital, but is denying any SI/HI. He is looking forward to go home. His sleep has improved, however still complaining of  physical pain and headaches, which on first encounter where stated reasons for him to kill himself. Patient will be followed up tomorrow and if he continues to deny any SI/HI and family does not have any safety concerns with discharge, will consider discontinuing the hold tomorrow.     Dx: adjustment disorder with depressed mood  Schizophrenia by hx    Plan:  Legal hold extended  1- Continue remeron 7.5mg PO daily for depression and insomnia  2-Obtain collateral for safe discharge  3- Psychiatry team will continue to follow

## 2019-02-06 NOTE — PROGRESS NOTES
Trauma / Surgical Daily Progress Note    Date of Service  2/6/2019    Chief Complaint  28 y.o. male admitted 1/30/2019 with SAH (subarachnoid hemorrhage) (HCC)    Interval Events  Pain control adequate  Needs to mobilize    - Discussed need for stool softeners, suppository if no results from stool softeners  - Mobilize, out of bed for all meals  - Disposition per psychiatry, medically clear for inpatient psychiatry    Review of Systems  Review of Systems   Constitutional: Positive for malaise/fatigue.   Eyes: Positive for photophobia.   Respiratory: Negative for shortness of breath.    Cardiovascular: Negative for chest pain.   Gastrointestinal: Negative for constipation, nausea and vomiting.        2/2 BM   Genitourinary:        Voiding    Musculoskeletal: Negative for myalgias.   Neurological: Positive for headaches.        Vital Signs  Temp:  [36.4 °C (97.6 °F)-37.2 °C (99 °F)] 36.7 °C (98.1 °F)  Pulse:  [47-56] 48  Resp:  [16-17] 17  BP: ()/(46-63) 99/48  SpO2:  [95 %-96 %] 95 %    Physical Exam  Physical Exam   Constitutional: He appears well-developed. No distress.   Sitter at bedside   HENT:   Head: Normocephalic.   Cardiovascular: Normal rate.    Pulmonary/Chest: Effort normal. No respiratory distress.   Abdominal: Soft. There is no tenderness.   Musculoskeletal:   Moves all extremities   Skin: Skin is warm and dry.   Nursing note and vitals reviewed.      Laboratory  No results found for this or any previous visit (from the past 24 hour(s)).    Fluids    Intake/Output Summary (Last 24 hours) at 02/06/19 0916  Last data filed at 02/06/19 0532   Gross per 24 hour   Intake              240 ml   Output                0 ml   Net              240 ml       Core Measures & Quality Metrics  Medications reviewed  Shi catheter: No Shi      DVT Prophylaxis: Contraindicated - High bleeding risk    Ulcer prophylaxis: Not indicated    Assessed for rehab: Patient returned to prior level of function,  rehabilitation not indicated at this time    Total Score: 3    ETOH Screening  CAGE Score: 0  Intervention complete date: 1/31/2019  Patient response to intervention: I drink twice per week, socially .   Patient demonstrats understanding of intervention.Plan of care:    has not been contacted.Follow up with: PCP  Total ETOH intervention time: 15 - 30 mintues      Assessment/Plan  Psychiatric complaint   Assessment & Plan    2/4 Verbalized wanting to kill self to nurses  Legal hold placed  Pscyh consult called and order placed  - Start mirtazapine 7.5 mg qHS for mood/insomnia per psychiatry team     ICB (intracranial bleed) (HCC)- (present on admission)   Assessment & Plan    Referring facility CT head shows right frontal intraparenchymal 2cm hemorrhage with a smaller right frontal convexity 11mm.   Frontal falx subarachnoid hemorrhage extending to the left 5mm with a right to left shift  Small frontal floor extra-axial blood noted.   Repeat CT imaging with no significant interval change  Keppra x 7 days (2/5/19)  Non-operative management.  Rsusell Lara MD. Neurosurgery      Suicide risk   Assessment & Plan    2/4 psych consult  Legal hold extended.        Skull fracture (HCC)- (present on admission)   Assessment & Plan    Referring facility CT head shows post-occipital skull fracture.  Non-operative management.  Russell Lara MD. Neurosurgery      Contraindication to deep vein thrombosis (DVT) prophylaxis- (present on admission)   Assessment & Plan    Systemic anticoagulation contraindicated secondary to elevated bleeding risk.  1/31 Trauma screening bilateral lower extremity venous duplex negative for above knee DVT      Trauma- (present on admission)   Assessment & Plan    Fall on ice, striking back of head. (+)loss of consciousness  Trauma Green Transfer Activation.  Kristin Salvador MD, Trauma/General Surgery.          Discussed patient condition with RN, Patient and trauma surgery, Dr. Salvador.

## 2019-02-07 ENCOUNTER — APPOINTMENT (OUTPATIENT)
Dept: RADIOLOGY | Facility: MEDICAL CENTER | Age: 29
DRG: 083 | End: 2019-02-07
Attending: NURSE PRACTITIONER

## 2019-02-07 PROCEDURE — A9270 NON-COVERED ITEM OR SERVICE: HCPCS | Performed by: STUDENT IN AN ORGANIZED HEALTH CARE EDUCATION/TRAINING PROGRAM

## 2019-02-07 PROCEDURE — 700102 HCHG RX REV CODE 250 W/ 637 OVERRIDE(OP): Performed by: SURGERY

## 2019-02-07 PROCEDURE — 770001 HCHG ROOM/CARE - MED/SURG/GYN PRIV*

## 2019-02-07 PROCEDURE — A9270 NON-COVERED ITEM OR SERVICE: HCPCS | Performed by: NURSE PRACTITIONER

## 2019-02-07 PROCEDURE — 700102 HCHG RX REV CODE 250 W/ 637 OVERRIDE(OP): Performed by: STUDENT IN AN ORGANIZED HEALTH CARE EDUCATION/TRAINING PROGRAM

## 2019-02-07 PROCEDURE — 93970 EXTREMITY STUDY: CPT

## 2019-02-07 PROCEDURE — A9270 NON-COVERED ITEM OR SERVICE: HCPCS | Performed by: SURGERY

## 2019-02-07 PROCEDURE — 700102 HCHG RX REV CODE 250 W/ 637 OVERRIDE(OP): Performed by: NURSE PRACTITIONER

## 2019-02-07 PROCEDURE — 700112 HCHG RX REV CODE 229: Performed by: SURGERY

## 2019-02-07 PROCEDURE — 99232 SBSQ HOSP IP/OBS MODERATE 35: CPT | Performed by: PSYCHIATRY & NEUROLOGY

## 2019-02-07 PROCEDURE — 93970 EXTREMITY STUDY: CPT | Mod: 26 | Performed by: SURGERY

## 2019-02-07 RX ORDER — SUMATRIPTAN 25 MG/1
25 TABLET, FILM COATED ORAL
Status: DISCONTINUED | OUTPATIENT
Start: 2019-02-07 | End: 2019-02-08 | Stop reason: HOSPADM

## 2019-02-07 RX ORDER — ACETAMINOPHEN 325 MG/1
650 TABLET ORAL EVERY 6 HOURS
Status: DISCONTINUED | OUTPATIENT
Start: 2019-02-07 | End: 2019-02-08 | Stop reason: HOSPADM

## 2019-02-07 RX ORDER — IBUPROFEN 600 MG/1
600 TABLET ORAL EVERY 8 HOURS
Status: DISCONTINUED | OUTPATIENT
Start: 2019-02-07 | End: 2019-02-08 | Stop reason: HOSPADM

## 2019-02-07 RX ADMIN — SCOPALAMINE 1 PATCH: 1 PATCH, EXTENDED RELEASE TRANSDERMAL at 09:07

## 2019-02-07 RX ADMIN — OXYCODONE HYDROCHLORIDE 10 MG: 5 TABLET ORAL at 21:34

## 2019-02-07 RX ADMIN — ACETAMINOPHEN 650 MG: 325 TABLET, FILM COATED ORAL at 13:44

## 2019-02-07 RX ADMIN — IBUPROFEN 600 MG: 600 TABLET ORAL at 21:34

## 2019-02-07 RX ADMIN — MIRTAZAPINE 7.5 MG: 15 TABLET, FILM COATED ORAL at 21:34

## 2019-02-07 RX ADMIN — MAGNESIUM HYDROXIDE 30 ML: 400 SUSPENSION ORAL at 06:10

## 2019-02-07 RX ADMIN — IBUPROFEN 600 MG: 600 TABLET ORAL at 13:45

## 2019-02-07 RX ADMIN — BUTALBITAL, ACETAMINOPHEN, AND CAFFEINE 2 TABLET: 50; 325; 40 TABLET ORAL at 06:08

## 2019-02-07 RX ADMIN — DOCUSATE SODIUM 100 MG: 100 CAPSULE, LIQUID FILLED ORAL at 06:08

## 2019-02-07 RX ADMIN — SUMATRIPTAN SUCCINATE 25 MG: 25 TABLET ORAL at 23:21

## 2019-02-07 RX ADMIN — SUMATRIPTAN SUCCINATE 25 MG: 25 TABLET ORAL at 13:45

## 2019-02-07 RX ADMIN — POLYETHYLENE GLYCOL 3350 1 PACKET: 17 POWDER, FOR SOLUTION ORAL at 06:09

## 2019-02-07 ASSESSMENT — ENCOUNTER SYMPTOMS
FEVER: 0
HEADACHES: 1
CHILLS: 0
NAUSEA: 0
SHORTNESS OF BREATH: 0
PHOTOPHOBIA: 1
VOMITING: 0

## 2019-02-07 NOTE — PROGRESS NOTES
Trauma / Surgical Daily Progress Note    Date of Service  2/7/2019    Chief Complaint  28 y.o. male admitted 1/30/2019 with SAH (subarachnoid hemorrhage) (HCC)    Interval Events  Ongoing complaints of headache, worse this am    - Trial of scheduled Tylenol and ibuprofen for headache, discussed with neurosurgery APRN  - Stop Fioricet and start Imitrex for headache  - Remains medically clear for inpatient psychiatry  - Disposition per psychiatry    Review of Systems  Review of Systems   Constitutional: Negative for chills and fever.   Eyes: Positive for photophobia.   Respiratory: Negative for shortness of breath.    Cardiovascular: Negative for chest pain.   Gastrointestinal: Negative for nausea and vomiting.   Neurological: Positive for headaches.        Vital Signs  Temp:  [36.9 °C (98.5 °F)-37.3 °C (99.1 °F)] 37.3 °C (99.1 °F)  Pulse:  [52-61] 61  Resp:  [16] 16  BP: ()/(51-60) 107/60  SpO2:  [96 %-97 %] 97 %    Physical Exam  Physical Exam   Constitutional: No distress.   Sitter at bedside    Cardiovascular: Normal rate.    Pulmonary/Chest: Effort normal. No respiratory distress.   Musculoskeletal:   Moves all extremities    Neurological: He is alert.   Skin: Skin is warm.   Nursing note and vitals reviewed.      Laboratory  No results found for this or any previous visit (from the past 24 hour(s)).    Fluids    Intake/Output Summary (Last 24 hours) at 02/07/19 0850  Last data filed at 02/07/19 0600   Gross per 24 hour   Intake              480 ml   Output                0 ml   Net              480 ml       Core Measures & Quality Metrics  Medications reviewed  Shi catheter: No Shi      DVT Prophylaxis: Contraindicated - High bleeding risk    Ulcer prophylaxis: Not indicated    Assessed for rehab: Patient returned to prior level of function, rehabilitation not indicated at this time    Total Score: 3    ETOH Screening  CAGE Score: 0  Intervention complete date: 1/31/2019  Patient response to  intervention: I drink twice per week, socially .   Patient demonstrats understanding of intervention.Plan of care:    has not been contacted.Follow up with: PCP  Total ETOH intervention time: 15 - 30 mintues      Assessment/Plan  Psychiatric complaint   Assessment & Plan    2/4 Verbalized wanting to kill self to nurses  Legal hold placed  Pscyh consult called and order placed  - Start mirtazapine 7.5 mg qHS for mood/insomnia per psychiatry team      ICB (intracranial bleed) (HCC)- (present on admission)   Assessment & Plan    Referring facility CT head shows right frontal intraparenchymal 2cm hemorrhage with a smaller right frontal convexity 11mm.   Frontal falx subarachnoid hemorrhage extending to the left 5mm with a right to left shift  Small frontal floor extra-axial blood noted.   Repeat CT imaging with no significant interval change  Keppra x 7 days (2/5/19)  Non-operative management.  Russell Lara MD. Neurosurgery       Suicide risk   Assessment & Plan    2/4 psych consult  Legal hold extended.         Skull fracture (HCC)- (present on admission)   Assessment & Plan    Referring facility CT head shows post-occipital skull fracture.  Non-operative management.  Russell Laar MD. Neurosurgery       Contraindication to deep vein thrombosis (DVT) prophylaxis- (present on admission)   Assessment & Plan    Systemic anticoagulation contraindicated secondary to elevated bleeding risk.  1/31 Trauma screening bilateral lower extremity venous duplex negative for above knee DVT       Trauma- (present on admission)   Assessment & Plan    Fall on ice, striking back of head. (+)loss of consciousness  Trauma Green Transfer Activation.  Kristin Salvador MD, Trauma/General Surgery.           Discussed patient condition with RN, Pharmacy, Patient and nurse practitioner and trauma surgery, Meka SMITH and Dr. Salvador.

## 2019-02-07 NOTE — DISCHARGE PLANNING
Patient placed on Legal Hold 2/3/19, however could not be found. RN who initiated hold filled out new one, however hold  on 19. Spoke with Dr. Fish regarding patient. Dr. Lerner will re-evaluate patient today and place on new hold or DC.

## 2019-02-07 NOTE — PSYCHIATRY
BRIEF PSYCHIATRIC CONSULT NOTE: patient seen, full note to follow.  Patient is psychiatrically cleared for discharge. Please provider 14 days supply Prescription for Remeron 7.5mg Po at bedtime, and an appointment with PCP in 1-2 weeks.

## 2019-02-07 NOTE — PROGRESS NOTES
RN MOBILITY NOTE     Surgery patient?: no  Date of surgery: no  Ambulated 50 ft on day of surgery? (N/A if today is not date of surgery): n/a  Number of times ambulated 50 feet or greater today: 2  Patient has been up to chair, edge of bed or HOB 90 degrees for all meals?: no  Goal met? (goal is ambulating at least 50 feet 2 times on day shift, one time on night shift): yes  If patient did not meet mobility goal, why?: n/a

## 2019-02-07 NOTE — CARE PLAN
Problem: Communication  Goal: The ability to communicate needs accurately and effectively will improve  Outcome: PROGRESSING AS EXPECTED  Pt is able to voice their needs/feelings at this time.    Problem: Safety  Goal: Will remain free from falls  Outcome: PROGRESSING AS EXPECTED  Bed locked in lowest position, bed alarm on. Call light and personal belongings within reach. Hourly rounding.

## 2019-02-07 NOTE — PROGRESS NOTES
Bedside report received from night shift RN, care assumed. Pt assessed, A&Ox4.  1:1 sitter present in the room. Bed locked in lowest position. Call light within reach.Pt denies any pain/needs at this time.

## 2019-02-07 NOTE — CARE PLAN
Problem: Bowel/Gastric:  Goal: Will not experience complications related to bowel motility    Intervention: Assess baseline bowel pattern  Patient has positive bowel sounds, denies nausea/vomiting and is passing flatus. The patient is refusing stool softeners. The patient did take his miralax but no BM.       Problem: Psychosocial Needs:  Goal: Level of anxiety will decrease    Intervention: Identify and develop with patient strategies to cope with anxiety triggers  Patient has 1:1 sitter. Pt denying SI/HI. Patient in low lit room with sunglasses on to decrease photophobia and anxiety.

## 2019-02-08 VITALS
WEIGHT: 159.61 LBS | TEMPERATURE: 97.3 F | SYSTOLIC BLOOD PRESSURE: 99 MMHG | HEIGHT: 70 IN | RESPIRATION RATE: 17 BRPM | BODY MASS INDEX: 22.85 KG/M2 | DIASTOLIC BLOOD PRESSURE: 48 MMHG | OXYGEN SATURATION: 100 % | HEART RATE: 50 BPM

## 2019-02-08 PROBLEM — R45.89 SUICIDE RISK: Status: RESOLVED | Noted: 2019-02-05 | Resolved: 2019-02-08

## 2019-02-08 PROCEDURE — 700102 HCHG RX REV CODE 250 W/ 637 OVERRIDE(OP): Performed by: NURSE PRACTITIONER

## 2019-02-08 PROCEDURE — A9270 NON-COVERED ITEM OR SERVICE: HCPCS | Performed by: NURSE PRACTITIONER

## 2019-02-08 RX ORDER — OXYCODONE HYDROCHLORIDE 5 MG/1
5 TABLET ORAL
Qty: 10 TAB | Refills: 0 | Status: SHIPPED | OUTPATIENT
Start: 2019-02-08 | End: 2019-02-08

## 2019-02-08 RX ORDER — SUMATRIPTAN 25 MG/1
25 TABLET, FILM COATED ORAL
Qty: 10 TAB | Refills: 0 | Status: SHIPPED | OUTPATIENT
Start: 2019-02-08 | End: 2019-02-08

## 2019-02-08 RX ORDER — OXYCODONE HYDROCHLORIDE 5 MG/1
5 TABLET ORAL
Qty: 10 TAB | Refills: 0 | Status: SHIPPED | OUTPATIENT
Start: 2019-02-08 | End: 2019-02-15

## 2019-02-08 RX ORDER — POLYETHYLENE GLYCOL 3350 17 G/17G
17 POWDER, FOR SOLUTION ORAL 2 TIMES DAILY
Refills: 3 | COMMUNITY
Start: 2019-02-08

## 2019-02-08 RX ORDER — MIRTAZAPINE 7.5 MG/1
7.5 TABLET, FILM COATED ORAL
Qty: 14 TAB | Refills: 0 | Status: SHIPPED | OUTPATIENT
Start: 2019-02-08 | End: 2019-02-08

## 2019-02-08 RX ORDER — IBUPROFEN 600 MG/1
600 TABLET ORAL EVERY 8 HOURS
Qty: 45 TAB | Refills: 0 | Status: SHIPPED | OUTPATIENT
Start: 2019-02-08 | End: 2019-02-08

## 2019-02-08 RX ORDER — MIRTAZAPINE 7.5 MG/1
7.5 TABLET, FILM COATED ORAL
Qty: 14 TAB | Refills: 0 | Status: SHIPPED | OUTPATIENT
Start: 2019-02-08

## 2019-02-08 RX ORDER — SUMATRIPTAN 25 MG/1
25 TABLET, FILM COATED ORAL
Qty: 10 TAB | Refills: 0 | Status: SHIPPED | OUTPATIENT
Start: 2019-02-08

## 2019-02-08 RX ORDER — ACETAMINOPHEN 325 MG/1
650 TABLET ORAL EVERY 6 HOURS PRN
COMMUNITY
Start: 2019-02-08

## 2019-02-08 RX ORDER — IBUPROFEN 600 MG/1
600 TABLET ORAL EVERY 8 HOURS
Qty: 45 TAB | Refills: 0 | Status: SHIPPED | OUTPATIENT
Start: 2019-02-08 | End: 2019-02-23

## 2019-02-08 RX ADMIN — IBUPROFEN 600 MG: 600 TABLET ORAL at 04:17

## 2019-02-08 ASSESSMENT — ENCOUNTER SYMPTOMS
CHILLS: 0
FOCAL WEAKNESS: 0
PHOTOPHOBIA: 1
HEADACHES: 1
ABDOMINAL PAIN: 0
MYALGIAS: 0
NAUSEA: 0
SHORTNESS OF BREATH: 0
FEVER: 0
SENSORY CHANGE: 0
DIZZINESS: 0
ROS GI COMMENTS: 2/7 BM
COUGH: 0
VOMITING: 0
DOUBLE VISION: 0

## 2019-02-08 NOTE — CARE PLAN
Problem: Safety  Goal: Will remain free from injury  Outcome: PROGRESSING AS EXPECTED    Intervention: Provide assistance with mobility  ENCOURAGE TO CALL FOR ANY ASSISTANCE NEEDED, CALL LIGHT WITHIN REACH.      Problem: Knowledge Deficit  Goal: Knowledge of disease process/condition, treatment plan, diagnostic tests, and medications will improve  Outcome: PROGRESSING AS EXPECTED    Intervention: Explain information regarding disease process/condition, treatment plan, diagnostic tests, and medications and document in education  Plan discussed with patient.

## 2019-02-08 NOTE — PROGRESS NOTES
Trauma / Surgical Daily Progress Note    Date of Service  2/8/2019    Chief Complaint  28 y.o. male admitted 1/30/2019 with SAH (subarachnoid hemorrhage) (HCC)    Interval Events  Psychiatry recommendations reviewed  No legal hold  Headache improved  Tertiary survey completed  Opioid risk tool completed and discussed elevated risk with patient    - Discharge home today    Review of Systems  Review of Systems   Constitutional: Negative for chills and fever.   Eyes: Positive for photophobia. Negative for double vision.   Respiratory: Negative for cough and shortness of breath.    Cardiovascular: Negative for chest pain.   Gastrointestinal: Negative for abdominal pain, nausea and vomiting.        2/7 BM   Genitourinary:        Voiding   Musculoskeletal: Negative for myalgias.   Neurological: Positive for headaches. Negative for dizziness, sensory change and focal weakness.        Vital Signs  Temp:  [36.1 °C (97 °F)-37.2 °C (99 °F)] 36.3 °C (97.3 °F)  Pulse:  [50-60] 50  Resp:  [17-18] 17  BP: ()/(48-59) 99/48  SpO2:  [95 %-100 %] 100 %    Physical Exam  Physical Exam   Constitutional: He is oriented to person, place, and time. He appears well-developed. No distress.   HENT:   Head: Normocephalic.   Eyes: Conjunctivae are normal.   Neck: No JVD present. No tracheal deviation present.   Cardiovascular: Normal rate.    Pulmonary/Chest: Effort normal. No respiratory distress.   Abdominal: Soft. There is no tenderness.   Musculoskeletal:   Moves all extremities    Neurological: He is alert and oriented to person, place, and time.   Skin: Skin is warm and dry.   Nursing note and vitals reviewed.      Laboratory  No results found for this or any previous visit (from the past 24 hour(s)).    Fluids    Intake/Output Summary (Last 24 hours) at 02/08/19 0832  Last data filed at 02/08/19 0600   Gross per 24 hour   Intake              720 ml   Output                0 ml   Net              720 ml       Core Measures & Quality  Metrics  Medications reviewed  Shi catheter: No Shi      DVT Prophylaxis: Contraindicated - High bleeding risk    Ulcer prophylaxis: Not indicated    Assessed for rehab: Patient returned to prior level of function, rehabilitation not indicated at this time    Total Score: 3    ETOH Screening  CAGE Score: 0  Intervention complete date: 1/31/2019  Patient response to intervention: I drink twice per week, socially .   Patient demonstrats understanding of intervention.Plan of care:    has not been contacted.Follow up with: PCP  Total ETOH intervention time: 15 - 30 mintues      Assessment/Plan  Psychiatric complaint   Assessment & Plan    2/4 Verbalized wanting to kill self to nurses  Legal hold placed  Pscyh consult called and order placed  - Start mirtazapine 7.5 mg qHS for mood/insomnia per psychiatry team   2/7 Legal hold discontinued per psychiatry     ICB (intracranial bleed) (HCC)- (present on admission)   Assessment & Plan    Referring facility CT head shows right frontal intraparenchymal 2cm hemorrhage with a smaller right frontal convexity 11mm.   Frontal falx subarachnoid hemorrhage extending to the left 5mm with a right to left shift  Small frontal floor extra-axial blood noted.   Repeat CT imaging with no significant interval change  Keppra x 7 days (2/5/19)  Non-operative management.  Russell Lara MD. Neurosurgery     Suicide risk   Assessment & Plan    2/4 psych consult  Legal hold extended.    2/7 Legal hold discontinued per psychiatry       Skull fracture (HCC)- (present on admission)   Assessment & Plan    Referring facility CT head shows post-occipital skull fracture.  Non-operative management.  Russell Lara MD. Neurosurgery     Contraindication to deep vein thrombosis (DVT) prophylaxis- (present on admission)   Assessment & Plan    Systemic anticoagulation contraindicated secondary to elevated bleeding risk.  1/31, 2/7 Trauma screening bilateral lower extremity venous duplex  negative for above knee DVT       Trauma- (present on admission)   Assessment & Plan    Fall on ice, striking back of head. (+)loss of consciousness  Trauma Green Transfer Activation.  Kristin Salvador MD, Trauma/General Surgery.           Discussed patient condition with RN, Patient and trauma surgery, Dr. Salvador.

## 2019-02-08 NOTE — PSYCHIATRY
PSYCHIATRIC FOLLOW-UP:(established)  Reason for admission: TBI  Reason for consult: SI  Requesting Physician: Kristin Salvador M.D.              *HPI:  Pt reported that he has been doing mental and emotional well, and that he has not had any SI since last Friday when he was in excruciating pain. Pt stated that he does not have any active or passive SI, and that he feels that his mood is stable. Pt reported that prior coming to the hospital he was sleeping 6 hours, but that the remeron is not helping him get more than 3 hours at night, HE reported vivid dreams at night, but denied any out of reality feelings during the day. Pt reported seeing a little girl at times during the day, however does not feel threatened, or scared by the vision. He said the girl does not interact with him and that the vision lasts a few seconds. He reported having vision of dark figure in the past as well; and requested not to have medications at this time. Patient denied any current AVH during evaluation, neither paranoia or delusions. He denied any acute symptoms of vandana, anxiety or depression. Pt reported that his plan is to go back to [place], go back to his job, and take a break on drinking alcohol. He reported drinking about 3 times a week, and getting drinking occasionally. He uses cocaine about twice per month and marijuana occasionally. Pt does not believe that he has a drug addiction that requires rehab treatment, and laughed when this provider if he was interested on rehab. Pt stated that he does not have any plans to kill himself, and identified his 9 month old son and his family as his main reasons to live. Pt reported feeling content with his life, and motivated to go back to work. He admitted having mood liability at times, and stated that walking helps him calm down. Pt reported going through a stressful time with the mother of his son, however feels supported by family and friends, and declined referral to outpatient  "psychotherapy at this time. Pt is planning to stay with his son's mother's cousin tonight in [place], then arrange to have a family member to drive him to [place]. Pt denied any hx of SA/SIB.     Medical ROS (as pertinent):    Migraine, light sensitivity        This provider spoke  With pt's mother over the phone. She reported that pt does not have any specific psychiatric diagnosis, however he has a significant hx of alcohol and illicit substance use. Pt has mood liability that interferes with interaction with family members. He has been arrested in the [facility] while intoxicated. He does not have a hx of SA/SIB, and does not have concerns that pt will kill himself or harm others if discharged from the hospital, however feels that pt should receive treatment for his alcohol and substance use, and be on a mood stabilizer.                *Psychiatric Examination:  Vitals:   Vitals:    02/07/19 1645   BP: (!) 93/54   Pulse: (!) 59   Resp: 17   Temp: 36.1 °C (97 °F)   SpO2:        General Appearance:  man, lying down in bed wearing a sunglass, fair grooming and hygiene  Behavior: calm and cooperative; does not appear to be internally preoccupied or to be responding to internal stimuli   Abnormal Movements: no psychomotor retardation or agiation  Gait and Posture: not tested  Speech: mis volume, tone and rhythm  Thought process: linear and goal oriented  Abnormal or Psychotic Thoughts: reports visual hallucination, denies AH, no paranoia or delusions elicited   Judgement and Insight: fair/fair  Orientation: grossly oriented  Recent and Remote Memory: fair  Attention Span and Concentration: intact  Mood and Affect: \"good\", constricted   SI/HI:denies any SI/HI      Current Facility-Administered Medications   Medication Dose Route Frequency Provider Last Rate Last Dose   • acetaminophen (TYLENOL) tablet 650 mg  650 mg Oral Q6HRS Jeni River, A.P.R.N.   650 mg at 02/07/19 1344   • ibuprofen (MOTRIN) tablet 600 mg  " 600 mg Oral Q8HRS Jeni River, A.P.R.N.   600 mg at 02/07/19 1345   • SUMAtriptan (IMITREX) tablet 25 mg  25 mg Oral Q2HRS PRN Jeni River, A.P.R.N.   25 mg at 02/07/19 1345   • scopolamine (TRANSDERM-SCOP) patch 1 Patch  1 Patch Transdermal Q72HRS Kristin Salvador M.D.   1 Patch at 02/07/19 0907   • mirtazapine (REMERON) tablet 7.5 mg  7.5 mg Oral QHS Gil Fish M.D.   7.5 mg at 02/06/19 2106   • oxyCODONE immediate-release (ROXICODONE) tablet 5-10 mg  5-10 mg Oral Q3HRS PRN BUTCH Carey.P.R.N.   10 mg at 02/06/19 2111   • Respiratory Care per Protocol   Nebulization Continuous RT Kristin Salvador M.D.       • Pharmacy Consult Request ...Pain Management Review 1 Each  1 Each Other PHARMACY TO DOSE Kristin Salvador M.D.       • docusate sodium (COLACE) capsule 100 mg  100 mg Oral BID Kristin Salvador M.D.   100 mg at 02/07/19 0608   • senna-docusate (PERICOLACE or SENOKOT S) 8.6-50 MG per tablet 1 Tab  1 Tab Oral Q24HRS PRN Kristin Salvador M.D.       • polyethylene glycol/lytes (MIRALAX) PACKET 1 Packet  1 Packet Oral BID Kristin Salvador M.D.   1 Packet at 02/07/19 0609   • magnesium hydroxide (MILK OF MAGNESIA) suspension 30 mL  30 mL Oral DAILY Kristin Salvador M.D.   30 mL at 02/07/19 0610   • bisacodyl (DULCOLAX) suppository 10 mg  10 mg Rectal Q24HRS PRN Kristin Salvador M.D.       • fleet enema 133 mL  1 Each Rectal Once PRN Kristin Salvador M.D.       • ondansetron (ZOFRAN) syringe/vial injection 4 mg  4 mg Intravenous Q4HRS PRN Kristin Salvador M.D.   4 mg at 02/05/19 8435        Assessment: Pt with hx of cocaine, alcohol and cannabis use dso, who was admitted for management of TBI after falling and hitting his head while intoxicated with alcohol. Pt denies any SI for the past 4 days, and although he has endorses depressed mood due to environemtn, he has been calm, cooperative, and his mood has been overall stable. Pt has not made any suicidal gestures, neither has been aggressive. Pt has a  significant hx of substance use dso placing him at chronic risk of danger to self, however, pt has been denying any SI/Hi, has no hx of SA/SIb, is projecting into the future, is motivated to go back to work  and identifies reason to live. Therefore pt at this time poses low acute risk of danger to self or others and is does not meet criteria for further higher level of psychiatric care. Pt would highly benefit from going to substance rehab program, however declines referral at this time. Pt is psychiatrically cleared for discharge.       Dx: adjustment disorder with depressed mood  Alcohol use dso, severe  cocaine use dso  Cannabis use dso     Plan:  Legal hold  on 19. Pt is psychiatrically cleared for discharge.   1- Please provider 14 day supply Rx for remeron 7.5mg PO daily for depression and insomnia  2-Please provide pt an appointment with PCP within 1 week of discharge  3- Psychiatry team signing off    Thank you for the consult.

## 2019-02-08 NOTE — DISCHARGE INSTRUCTIONS
Call or seek medical attention for questions or concerns   - Follow up with Dr. Lara in 2 weeks time   - Follow up with Dr. Salvador as needed   - Follow up with primary care provider within one weeks time   - Resume regular diet   - May take over the counter acetaminophen or ibuprofen as needed for pain   - No driving until cleared by outpatient provider   - Continue daily over the counter stool softener while on narcotics   - No operation of machinery or motorized vehicles while under the influence of narcotics   - No alcohol, marijuana or illicit drug use while under the influence of narcotics   - No swimming, hot tubs, baths or wound submersion until cleared by outpatient provider. May shower   - No contact sports, strenuous activities, or heavy lifting until cleared by outpatient provider   - If respiratory decompensation, neurologic decompensation, or signs or symptoms of infection occur seek medical attention    Discharge Instructions    Discharged to home by car with self. Discharged via walking, hospital escort: Refused.  Special equipment needed: Not Applicable    Be sure to schedule a follow-up appointment with your primary care doctor or any specialists as instructed.     Discharge Plan:   Diet Plan: Discussed  Activity Level: Discussed  Confirmed Follow up Appointment: Appointment Scheduled  Confirmed Symptoms Management: Discussed  Medication Reconciliation Updated: Yes  Influenza Vaccine Indication: Patient Refuses    I understand that a diet low in cholesterol, fat, and sodium is recommended for good health. Unless I have been given specific instructions below for another diet, I accept this instruction as my diet prescription.   Other diet: regular    Special Instructions:   Subarachnoid Hemorrhage  Subarachnoid hemorrhage is bleeding in the area between the brain and the membrane that covers the brain. The bleeding puts more pressure on the brain and stops blood from reaching some areas of the  brain. It is very serious. It may cause brain damage, stroke, or death if not treated. You must be treated in the hospital right away.  What increases the risk?  You may be more likely to have this condition if you:  · Smoke.  · Have high blood pressure (hypertension).  · Drink too much alcohol.  · Are a female, especially after menopause.  · Have a family history of disease in the blood vessels of the brain.  · Have a certain inherited kidney disease or connective tissue disease.  What are the signs or symptoms?  · Having a sudden, severe headache.  · Feeling sick to your stomach (nauseous) or throwing up (vomiting) combined with other problems.  · Suddenly feeling weak.  · Losing feeling on your face, arm, or leg, especially on one side of the body.  · Suddenly having trouble walking or moving your arms or legs.  · Suddenly feeling confused.  · Suddenly having a change in mood or personality.  · Having trouble talking or understanding.  · Having trouble swallowing.  · Suddenly having trouble seeing.  · Seeing double.  · Feeling dizzy.  · Losing your balance or coordination.  · Having light bother or hurt your eyes.  · Having a stiff neck.  Follow these instructions at home:  · Take all medicines exactly as told by your doctor.  · Eat healthy foods if you can swallow.  ¨ Eat foods that are low in salt and cholesterol.  ¨ Eat foods that are low in saturated and trans fat.  ¨ If told, eat soft or pureed foods so that you do not choke.  ¨ If told, take small bites of food so that you do not choke.  · Rest as told by your doctor.  · Limit your activity as told by your doctor.  · Do not smoke.  · Limit how much alcohol you drink.  ¨ Men-drink no more than 2 drinks a day.  ¨ Women who are not pregnant-drink no more than 1 drink a day.  · Make changes to your lifestyle as told by your doctor.  · Keep track of your blood pressure as told by your doctor.  · Keep your home safe so you do not fall.  ¨ Put grab bars in the  bedroom and bathroom.  ¨ Raise toilet seats.  ¨ Put a seat in the shower.  · Go to therapy sessions as told by your doctor. This may include physical, occupational, and speech therapy.  · Use a walker or cane at all times, if told to do so.  · Keep all follow-up visits with your doctor and other specialists.  Get help right away if:  · You have a sudden, severe headache with no known cause.  · You are sick to your stomach or throw up, and have another problem.  · You have a sudden weakness.  · You lose feeling on one side of your body.  · You suddenly have trouble walking or moving arms or legs.  · You suddenly feel confused.  · You have trouble talking or understanding.  · You suddenly have trouble seeing.  · You lose your balance or your movements are not coordinated.  · You have a stiff neck.  · You have trouble breathing.  · You are partly or totally unaware of what is going on around you.  The symptoms above may be a sign of a serious problem that is an emergency. Do not wait to see if the symptoms will go away. Get medical help right away. Call your local emergency services (911 in [place] Do not drive yourself to the hospital.   This information is not intended to replace advice given to you by your health care provider. Make sure you discuss any questions you have with your health care provider.  Document Released: 04/14/2014 Document Revised: 05/25/2017 Document Reviewed: 01/31/2014  [employer] Patient Education © 2017 [employer]      · Is patient discharged on Warfarin / Coumadin?   No     Depression / Suicide Risk    As you are discharged from this [facility] facility, it is important to learn how to keep safe from harming yourself.    Recognize the warning signs:  · Abrupt changes in personality, positive or negative- including increase in energy   · Giving away possessions  · Change in eating patterns- significant weight changes-  positive or negative  · Change in sleeping patterns-  unable to sleep or sleeping all the time   · Unwillingness or inability to communicate  · Depression  · Unusual sadness, discouragement and loneliness  · Talk of wanting to die  · Neglect of personal appearance   · Rebelliousness- reckless behavior  · Withdrawal from people/activities they love  · Confusion- inability to concentrate     If you or a loved one observes any of these behaviors or has concerns about self-harm, here's what you can do:  · Talk about it- your feelings and reasons for harming yourself  · Remove any means that you might use to hurt yourself (examples: pills, rope, extension cords, firearm)  · Get professional help from the community (Mental Health, Substance Abuse, psychological counseling)  · Do not be alone:Call your Safe Contact- someone whom you trust who will be there for you.  · Call your local CRISIS HOTLINE 356-0130 or 352-824-2737  · Call your local Children's Mobile Crisis Response Team [place] (359) 284-7701 or www.[employer]  · Call the toll free National Suicide Prevention Hotlines   · National Suicide Prevention Lifeline 511-298-SCNA (2901)  · National Hope Line Network 800-SUICIDE (785-8537)

## 2019-02-08 NOTE — PROGRESS NOTES
Assessment completed. Pt is A/Ox4. Reports pain of 6/10. No interventions provided at this time. Denies numbness and tingling; nausea or vomiting. Pt is a standby assist. Pt needs have all been met at this time. Pt calls appropriately. Bed alarm, personal belongings, and bedside table are all within reach. Bed is in the lowest position and locked.

## 2019-02-08 NOTE — PROGRESS NOTES
Pt refused bed alarm. HA medicated per MAR. Patient went for walk with UC. Patient now resting in room

## 2019-02-08 NOTE — PROGRESS NOTES
Pt is discharged and left via taxi. He was given discharge paperwork and his prescription scripts. He verbalized understanding for his discharge instructions. He also received taxi vouchers to take him to the pharmacy to  his prescriptions and then to the airport to ride the bus to get back to [place].

## 2019-02-08 NOTE — DISCHARGE PLANNING
Anticipated Discharge Disposition:   RN    Action:   Per RN, pt to be discharge today.   Prescriptions received but [facility] unable to pay for over the counter med like Ibuprofen (prescribed) and narcotic medications. [facility]     Information on substance abuse programs given to pt. Explained to pt that he needs to call and self enroll.    Pt said he will try and get a hold of family to pick him up but CM also asked SW sup for approval for bus fare. [facility] will reasearch cost and then will follow up with SW sup.     Barriers to Discharge:   none    Plan:   Wait for the [facility] to call CM back re: cost for Remeron ($14.92) and Imitrex ( was not paid for since it is a prn medication)    RN notified CM that brother Harjinder called her and said that they dont have anyone to  pt.   Pt said he has no one to pick him up and he lives in [place].  Per [facility], there is a bus that leaves [facility] by 130pm Cost is $46.00.  Pt said he can just use his credit card and pt said he has a few dollars also.  Notified nursing and CN. Taxi voucher given for [facility] to [facility] to [facility] and then to the [place]. Pt will call his family.     Addendum:  Notified brother that pt has been discharge. Brother Harjinder claimed that he has tried to contact CM but there was no message left. Explained to Harjinder that pt said he will contact family.  He will attempt to contact his brother. Notified Harjinder that pt is riding the [employer] and it is leaving the [facility] at 1330.

## 2019-02-08 NOTE — PROGRESS NOTES
RN MOBILITY NOTE     Surgery patient?: no  Date of surgery: n/a  Ambulated 50 ft on day of surgery? (N/A if today is not date of surgery): n/a  Number of times ambulated 50 feet or greater today: 1  Patient has been up to chair, edge of bed or HOB 90 degrees for all meals?: no  Goal met? (goal is ambulating at least 50 feet 2 times on day shift, one time on night shift): no   If patient did not meet mobility goal, why?: pt refused

## 2019-02-10 NOTE — DISCHARGE SUMMARY
Trauma Discharge Summary    DATE OF ADMISSION: 1/30/2019    DATE OF DISCHARGE: 2/8/2019    LENGTH OF STAY: 9 days    ATTENDING PHYSICIAN: Dr. Kristin Salvador, critical care trauma services    CONSULTING PHYSICIAN:   1.  Dr. Russell Lara, neurosurgery  2.  Dr. Ella Lerner, psychiatry    DISCHARGE DIAGNOSIS:  1.  Psychiatric complaint  2.  Intracranial bleed  3.  Skull fracture  4.  Suicide risk, resolved  5.  Contraindication to DVT prophylaxis  6.  Trauma    PROCEDURES:  No procedures during this hospital course    HISTORY OF PRESENT ILLNESS: The patient is a 28 y.o. male involved in a ground-level fall.  He was subsequently transferred to [facility] for definite trauma care.  He was triaged as a Trauma green transfer in accordance with established pre-hospital protocols.    HOSPITAL COURSE: On arrival, outside imaging was reviewed and additional imaging and laboratory studies were obtained and the patient was admitted to the critical care team under the direction and supervision of Dr. Salvador.  He sustained the listed injuries and incurred the listed diagnosis during his stay.    He was transferred from the emergency department to the trauma intensive care unit.    Imaging completed at the referring facility demonstrated a right frontal intraparenchymal 2 cm hemorrhage with a smaller right frontal convexity measuring 11 mm.  There is also a frontal falx subarachnoid hemorrhage extending to the left 5 mm with a noted right to left shift.  There is an associated occipital skull fracture.  Dr. Lara with neurosurgery was consulted regarding this injury which was managed nonoperatively.  He did undergo repeat head CT which demonstrated no significant change or progression.  He received posttraumatic seizure prophylaxis for a total of 7 days.  He underwent a cognitive evaluation with no further cognitive therapy needs.    Patient demonstrated some suicidal ideations during his hospital course.   On February 4 a legal hold was placed and psychiatry was consulted.  He was started on antipsychotics per the recommendation of the psychiatry team.  On February 7 his legal hold was discontinued per the psychiatry team.  He did not demonstrate any suicidal ideations on day of discharge.    There was a contraindication to DVT prophylaxis due to elevated bleeding risk.  He did undergo trauma lower extremity duplexes which were noted to be negative on January 31 and February 7.  On day of discharge he is ambulating well.    He was medically stable for transfer to the neurosurgery [facility]reira on January 31 where a tertiary exam was performed.    On the day of discharge he denies any suicidal ideations, he is ambulating well and he reports adequate pain control.    DISCHARGE PHYSICAL EXAM: See epic physical exam dated 2/8/2019    DISCHARGE MEDICATIONS:  I reviewed the patients controlled substance history and obtained a controlled substance use informed consent (if applicable) provided by [facility] and the patient has been prescribed.       Medication List      START taking these medications      Instructions   acetaminophen 325 MG Tabs  Commonly known as:  TYLENOL   Take 2 Tabs by mouth every 6 hours as needed.  Dose:  650 mg     ibuprofen 600 MG Tabs  Commonly known as:  MOTRIN   Take 1 Tab by mouth every 8 hours for 15 days.  Dose:  600 mg     magnesium hydroxide 400 MG/5ML Susp  Commonly known as:  MILK OF MAGNESIA   Take 30 mL by mouth every day.  Dose:  30 mL     mirtazapine 7.5 MG tablet  Commonly known as:  REMERON   Take 1 Tab by mouth every bedtime.  Dose:  7.5 mg     oxyCODONE immediate-release 5 MG Tabs  Commonly known as:  ROXICODONE   Take 1 Tab by mouth every 3 hours as needed for up to 7 days.  Dose:  5 mg     polyethylene glycol/lytes Pack  Commonly known as:  MIRALAX   Take 1 Packet by mouth 2 Times a Day.  Dose:  17 g     SUMAtriptan 25 MG Tabs tablet  Commonly known as:  IMITREX   Take  1 Tab by mouth every 2 hours as needed for Migraine.  Dose:  25 mg            DISPOSITION: The patient will be discharged home in stable condition on February 8, 2019.  He will follow up with Dr. Lara in 2 weeks.  He will follow-up with a primary care provider to obtain further psychiatric care within 1 week.  He will follow-up with Dr. Salvador as needed.    The patient has been extensively counseled and all questions have been answered. Special attention was paid to respiratory decompensation, neurologic decompensation and signs and symptoms of infection and to seek immediate medical attention if these develop. The patient and family demonstrate understanding and give verbal compliance with discharge instructions.    TIME SPENT ON DISCHARGE: 45 minutes      ____________________________________________  MARGO Tucker    DD: 2/9/2019 4:18 PM